# Patient Record
Sex: MALE | Race: WHITE | NOT HISPANIC OR LATINO | Employment: FULL TIME | URBAN - METROPOLITAN AREA
[De-identification: names, ages, dates, MRNs, and addresses within clinical notes are randomized per-mention and may not be internally consistent; named-entity substitution may affect disease eponyms.]

---

## 2017-01-11 ENCOUNTER — TELEPHONE (OUTPATIENT)
Dept: NEUROLOGY | Facility: CLINIC | Age: 42
End: 2017-01-11

## 2017-01-11 NOTE — TELEPHONE ENCOUNTER
Spoke with representative Alvaro at [facility]. He verified that the medication Avonex was approved for lifetime as long as the members benefits did not change. I asked for an authorization number and he stated that no authorization numbers were generated because it is all electronic and instant and that the pharmacy could process it right now and it would go thru.     I called Logan and let him know. He was very grateful and stated he would call his pharmacy to get it shipped now. I informed him to call back with any questions or concerns.

## 2017-01-11 NOTE — TELEPHONE ENCOUNTER
----- Message from Corazon Parker sent at 1/11/2017  8:33 AM CST -----  Contact: [facility] pharmacy-    [phone]  Pharmacy need PA for rx avonex syringe. The pharmacy is faxing over form for PA for the rx.Thanks!

## 2017-03-31 DIAGNOSIS — G35 MULTIPLE SCLEROSIS: ICD-10-CM

## 2017-03-31 NOTE — TELEPHONE ENCOUNTER
----- Message from Hakeem Barry sent at 3/31/2017 11:30 AM CDT -----  Contact: Logan  States he should have MRI every 6 months. April 7 will be 6 months from last. He is having  A little bit of vision issues, not sure if it is related to migraine. Please call [phone]. Thank you!

## 2017-04-07 ENCOUNTER — TELEPHONE (OUTPATIENT)
Dept: NEUROLOGY | Facility: CLINIC | Age: 42
End: 2017-04-07

## 2017-04-07 NOTE — TELEPHONE ENCOUNTER
----- Message from Anna Moreno sent at 4/7/2017  9:22 AM CDT -----  Contact: [phone]  Patient is requesting a call back from the nurse in regards to getting an MRI.   Please call the patient upon request at phone number [phone].

## 2017-04-11 DIAGNOSIS — G35 MULTIPLE SCLEROSIS: Primary | ICD-10-CM

## 2017-04-13 ENCOUNTER — TELEPHONE (OUTPATIENT)
Dept: NEUROLOGY | Facility: CLINIC | Age: 42
End: 2017-04-13

## 2017-04-13 NOTE — TELEPHONE ENCOUNTER
----- Message from Iris Johnson sent at 4/13/2017  2:27 PM CDT -----  Contact: self   Patient wants to speak with a nurse regarding previous phone call please call back at [phone]

## 2017-04-24 DIAGNOSIS — G35 MULTIPLE SCLEROSIS: ICD-10-CM

## 2017-04-28 ENCOUNTER — HOSPITAL ENCOUNTER (OUTPATIENT)
Dept: RADIOLOGY | Facility: HOSPITAL | Age: 42
Discharge: HOME | End: 2017-04-28
Attending: PSYCHIATRY & NEUROLOGY
Payer: COMMERCIAL

## 2017-04-28 ENCOUNTER — TELEPHONE (OUTPATIENT)
Dept: NEUROLOGY | Facility: CLINIC | Age: 42
End: 2017-04-28

## 2017-04-28 DIAGNOSIS — G35 MULTIPLE SCLEROSIS: ICD-10-CM

## 2017-04-28 PROCEDURE — 70553 MRI BRAIN STEM W/O & W/DYE: CPT | Mod: 26,,, | Performed by: RADIOLOGY

## 2017-04-28 PROCEDURE — 70553 MRI BRAIN STEM W/O & W/DYE: CPT | Mod: TC,PO

## 2017-04-28 PROCEDURE — 25500020 PHARM REV CODE 255: Mod: PO | Performed by: PSYCHIATRY & NEUROLOGY

## 2017-04-28 PROCEDURE — A9585 GADOBUTROL INJECTION: HCPCS | Mod: PO | Performed by: PSYCHIATRY & NEUROLOGY

## 2017-04-28 RX ORDER — GADOBUTROL 604.72 MG/ML
9 INJECTION INTRAVENOUS
Status: COMPLETED | OUTPATIENT
Start: 2017-04-28 | End: 2017-04-28

## 2017-04-28 RX ADMIN — GADOBUTROL 9 ML: 604.72 INJECTION INTRAVENOUS at 12:04

## 2017-05-01 ENCOUNTER — TELEPHONE (OUTPATIENT)
Dept: NEUROLOGY | Facility: CLINIC | Age: 42
End: 2017-05-01

## 2017-05-01 NOTE — TELEPHONE ENCOUNTER
----- Message from RT Kevin sent at 5/1/2017  9:16 AM CDT -----  Contact: Delia, [phone] [facility]  Delia, [phone] Option 3 [facility], requesting medication refill for the pt's Avonex pre filled syringes Ref No. [id number], thanks.

## 2017-06-20 ENCOUNTER — TELEPHONE (OUTPATIENT)
Dept: NEUROLOGY | Facility: CLINIC | Age: 42
End: 2017-06-20

## 2017-06-20 DIAGNOSIS — G35 MS (MULTIPLE SCLEROSIS): Primary | ICD-10-CM

## 2017-06-20 RX ORDER — MODAFINIL 100 MG/1
100 TABLET ORAL DAILY
Qty: 30 TABLET | Refills: 0 | Status: SHIPPED | OUTPATIENT
Start: 2017-06-20 | End: 2017-07-19 | Stop reason: SDUPTHER

## 2017-06-20 NOTE — TELEPHONE ENCOUNTER
Spoke with patient. He stated that no matter how much he is able to sleep, he is very fatigue. Patient is only taking Avonex every 7 days. Denies taking any other medications. Patient wants to know if there is something he can do to get more energy, take some sort of vitamin or medication?

## 2017-06-20 NOTE — TELEPHONE ENCOUNTER
----- Message from Nawaf Clement sent at 6/20/2017 12:23 PM CDT -----  Contact: same  Patient called in and requested a message be sent regarding a questions he has about his fatigue.  Patient has Ms.    Patient call back number is [phone]

## 2017-06-23 ENCOUNTER — TELEPHONE (OUTPATIENT)
Dept: NEUROLOGY | Facility: CLINIC | Age: 42
End: 2017-06-23

## 2017-06-23 NOTE — TELEPHONE ENCOUNTER
----- Message from Skye Watson sent at 6/23/2017  3:55 PM CDT -----  Contact: self  Patient called regarding medication was submitted to the pharmacy. Stating for the price of the medication is extremely high and wanted to know if it wasn't covered by insurance. Please contact [phone]

## 2017-06-26 NOTE — TELEPHONE ENCOUNTER
Spoke with the pt, reports the Provigil costs over $600.00. Is there an alternative for this or should we pursue prescription assistance? Please advise.

## 2017-07-10 ENCOUNTER — TELEPHONE (OUTPATIENT)
Dept: NEUROLOGY | Facility: CLINIC | Age: 42
End: 2017-07-10

## 2017-07-10 NOTE — TELEPHONE ENCOUNTER
Patient notified that I will send a message to Jessica Narvaez to see about getting assistance with medication.

## 2017-07-10 NOTE — TELEPHONE ENCOUNTER
----- Message from Katerina Alcantara sent at 7/10/2017 11:32 AM CDT -----  Contact: self  Patient spoke to a nurse about a week ago regarding medication    He has not  heard back form everyone    Please call  to advise.      Thanks

## 2017-07-10 NOTE — TELEPHONE ENCOUNTER
Jc Lopez,     Can you help this patient out? His medication Provigil  for Multiple Sclerosis is going to cost him about $600.

## 2017-07-18 ENCOUNTER — TELEPHONE (OUTPATIENT)
Dept: PHARMACY | Facility: CLINIC | Age: 42
End: 2017-07-18

## 2017-07-19 DIAGNOSIS — G35 MS (MULTIPLE SCLEROSIS): ICD-10-CM

## 2017-07-20 RX ORDER — MODAFINIL 100 MG/1
100 TABLET ORAL DAILY
Qty: 30 TABLET | Refills: 0 | Status: SHIPPED | OUTPATIENT
Start: 2017-07-20 | End: 2017-08-19

## 2017-07-21 ENCOUNTER — TELEPHONE (OUTPATIENT)
Dept: NEUROLOGY | Facility: CLINIC | Age: 42
End: 2017-07-21

## 2017-07-21 NOTE — TELEPHONE ENCOUNTER
Spoke with the pharmacy, they are faxing a prior auth request to our office. Unable to reach pt to let him now we are working on getting provigil approved.

## 2017-07-21 NOTE — TELEPHONE ENCOUNTER
Received fax from pharmacy stating provigil needed a prior authorization.     PA started and approved. Pharmacy and patient both notified.

## 2017-09-15 ENCOUNTER — TELEPHONE (OUTPATIENT)
Dept: NEUROLOGY | Facility: CLINIC | Age: 42
End: 2017-09-15

## 2017-09-15 NOTE — TELEPHONE ENCOUNTER
----- Message from Nazia Borden sent at 9/14/2017  4:11 PM CDT -----  Contact: self  Patient [phone] is calling to check the status of having a prescription Modafinil refilled/has been trying to get this filled for two days but has not received any response/please advise

## 2017-09-21 RX ORDER — MODAFINIL 100 MG/1
100 TABLET ORAL DAILY
Qty: 30 TABLET | Refills: 0 | Status: SHIPPED | OUTPATIENT
Start: 2017-09-21 | End: 2018-01-23 | Stop reason: SDUPTHER

## 2017-09-21 NOTE — TELEPHONE ENCOUNTER
Called the [facility] store in [place] and spoke to Sonia. Sonia stated the never received the medication of Modafinil. Called in Modafinil for 1 refill. Sonia verbalized understanding.

## 2017-09-21 NOTE — TELEPHONE ENCOUNTER
----- Message from Indiana Parada sent at 9/21/2017  3:14 PM CDT -----  Contact: pt  Pt states been trying to get hold office ,3rd time calling and the pharmacy has to regarding prescription that needs to be verified.Pt would like a call back ,please.    .1. What is the name of the medication you are requesting? modafinil (PROVIGIL)  2. What is the dose? 100 mg  3. How do you take the medication? Orally, topically, etc? oral  4. How often do you take this medication? Take 1 tablet (100 mg total) by mouth once daily.   5. Do you need a 30 day or 90 day supply? 30  6. How many refills are you requesting? Up to   7. What is your preferred pharmacy and location of the pharmacy? .  [facility] [address] [place] [address] [id number] [place] [address] [place] [facility] [address] [address]  Phone: [phone] Fax: [phone]      8. Who can we contact with further questions? .[phone]

## 2017-11-03 ENCOUNTER — TELEPHONE (OUTPATIENT)
Dept: NEUROLOGY | Facility: CLINIC | Age: 42
End: 2017-11-03

## 2017-11-03 DIAGNOSIS — G35 MULTIPLE SCLEROSIS: Primary | ICD-10-CM

## 2017-11-03 NOTE — TELEPHONE ENCOUNTER
Appointment Request From: Logan Vaz      With Provider: Shahida Magaña MD [[facility]]      Would Accept With:Only the person I've selected      Preferred Date Range: From 11/3/2017 To 11/6/2017      Preferred Times: Any      Reason for visit: Request an Appt      Comments:   I need to get an MRI set up for this month.   Please contact me once it's arranged.   [phone]     Sent via [email or web address]

## 2017-11-08 NOTE — TELEPHONE ENCOUNTER
The patient will call when he decides where he is going to get the MRI.  Instructed we will need to fax them orders and get it approved.  He will be responsible to make sure we get the disc and report.

## 2017-11-15 ENCOUNTER — TELEPHONE (OUTPATIENT)
Dept: NEUROLOGY | Facility: CLINIC | Age: 42
End: 2017-11-15

## 2017-11-15 NOTE — TELEPHONE ENCOUNTER
Returned call and spoke with patient. He stated he would like his MRI done at [facility]. Orders faxed to [phone].

## 2017-11-15 NOTE — TELEPHONE ENCOUNTER
----- Message from Jim Taylor sent at 11/15/2017 11:57 AM CST -----  Contact: pt  Pt is requesting a callback,would like to schedule his MRI on his brain  Call Back#[phone]  Thanks

## 2017-11-29 ENCOUNTER — OFFICE VISIT (OUTPATIENT)
Dept: FAMILY MEDICINE | Facility: CLINIC | Age: 42
End: 2017-11-29
Payer: COMMERCIAL

## 2017-11-29 VITALS
WEIGHT: 201.75 LBS | SYSTOLIC BLOOD PRESSURE: 124 MMHG | HEIGHT: 70 IN | TEMPERATURE: 98 F | RESPIRATION RATE: 16 BRPM | DIASTOLIC BLOOD PRESSURE: 84 MMHG | HEART RATE: 68 BPM | OXYGEN SATURATION: 97 % | BODY MASS INDEX: 28.88 KG/M2

## 2017-11-29 DIAGNOSIS — H10.32 ACUTE BACTERIAL CONJUNCTIVITIS OF LEFT EYE: Primary | ICD-10-CM

## 2017-11-29 DIAGNOSIS — J30.89 ACUTE NON-SEASONAL ALLERGIC RHINITIS, UNSPECIFIED TRIGGER: ICD-10-CM

## 2017-11-29 PROCEDURE — 90471 IMMUNIZATION ADMIN: CPT | Mod: S$GLB,,, | Performed by: INTERNAL MEDICINE

## 2017-11-29 PROCEDURE — 90686 IIV4 VACC NO PRSV 0.5 ML IM: CPT | Mod: S$GLB,,, | Performed by: INTERNAL MEDICINE

## 2017-11-29 PROCEDURE — 99214 OFFICE O/P EST MOD 30 MIN: CPT | Mod: 25,S$GLB,, | Performed by: NURSE PRACTITIONER

## 2017-11-29 RX ORDER — NEOMYCIN SULFATE, POLYMYXIN B SULFATE AND DEXAMETHASONE 3.5; 10000; 1 MG/ML; [USP'U]/ML; MG/ML
1 SUSPENSION/ DROPS OPHTHALMIC EVERY 4 HOURS
Qty: 5 ML | Refills: 1 | Status: SHIPPED | OUTPATIENT
Start: 2017-11-29 | End: 2018-01-10 | Stop reason: ALTCHOICE

## 2017-11-29 NOTE — PROGRESS NOTES
"Subjective:       Patient ID: Logan Vaz is a 42 y.o. male.    Chief Complaint: Conjunctivitis (left eye)    HPI new patient to me. Here with redness to left eye. Onset yesterday. Has had a cold for the past week. Eye has been crusted closed. Warm compress used. Not painful, just irritated. Taking mucinex and using flonase. Ears fine. Mild dry cough. Some mild nasal congestion, feels the cold is getting better. Sneezing at times. See ROS.    The following portion of the patients history was reviewed and updated as appropriate: allergies, current medications, past medical and surgical history. Past social history and problem list reviewed. Family PMH and Past social history reviewed. Tobacco, Illicit drug use reviewed.     Review of Systems   Constitutional: Negative for fatigue and fever.   HENT: Positive for congestion and sneezing. Negative for sinus pain, sinus pressure and sore throat.    Eyes: Positive for discharge, redness and itching. Negative for visual disturbance.   Respiratory: Positive for cough. Negative for shortness of breath and wheezing.    Cardiovascular: Negative for chest pain and palpitations.   Gastrointestinal: Negative for abdominal pain, diarrhea, nausea and vomiting.   Musculoskeletal: Negative.    Neurological: Negative for headaches.       Objective:     /84   Pulse 68   Temp 98.1 °F (36.7 °C) (Oral)   Resp 16   Ht 5' 10" (1.778 m)   Wt 91.5 kg (201 lb 11.5 oz)   SpO2 97%   BMI 28.94 kg/m²      Physical Exam     Constitutional: oriented to person, place, and time. well-developed and well-nourished.   HENT: normal Canals and TM. Nares patent. Throat without erythema or exudate.   Head: Normocephalic.   Eyes: Conjunctivae erythema left eye with crusting on eyelashes and mucous. Right eye is normal.  Pupils are equal, round, and reactive to light.   Neck: Normal range of motion. Neck supple. No tracheal deviation present. No thyromegaly present. no enlarged or tender " anterior cervical lymph nodes.  Cardiovascular: Normal rate, regular rhythm and normal heart sounds.    Pulmonary/Chest: Effort normal and breath sounds normal. No respiratory distress. No wheezes.   Musculoskeletal: Normal range of motion. Gait and coordination normal  Assessment:       1. Acute bacterial conjunctivitis of left eye    2. Acute non-seasonal allergic rhinitis, unspecified trigger        Plan:         Logan was seen today for conjunctivitis.    Diagnoses and all orders for this visit:    Acute bacterial conjunctivitis of left eye: avoid scratching eyes. Use eye drops as prescribed. Clean with  Danial's no tears baby shampoo.     Acute non-seasonal allergic rhinitis, unspecified trigger: Use flonase daily.     Other orders  -     neomycin-polymyxin-dexamethasone (MAXITROL) 3.5mg/mL-10,000 unit/mL-0.1 % DrpS; Place 1 drop into the left eye every 4 (four) hours.  -     Influenza - Quadrivalent (3 years & older) (PF)      Take medications only as prescribed  Healthy diet, exercise  Adequate rest  Adequate hydration  Avoid allergens  Avoid excessive caffeine

## 2017-12-04 ENCOUNTER — TELEPHONE (OUTPATIENT)
Dept: NEUROLOGY | Facility: CLINIC | Age: 42
End: 2017-12-04

## 2017-12-04 NOTE — TELEPHONE ENCOUNTER
----- Message from Joselyn Park sent at 12/4/2017  3:46 PM CST -----  Contact: self  Would like results from MRI taken last week. Please call back at [phone] (aqtz)

## 2017-12-05 ENCOUNTER — TELEPHONE (OUTPATIENT)
Dept: FAMILY MEDICINE | Facility: CLINIC | Age: 42
End: 2017-12-05

## 2017-12-05 RX ORDER — CIPROFLOXACIN HYDROCHLORIDE 3 MG/ML
1 SOLUTION/ DROPS OPHTHALMIC
Qty: 10 ML | Refills: 0 | Status: SHIPPED | OUTPATIENT
Start: 2017-12-05 | End: 2018-01-10

## 2017-12-05 NOTE — TELEPHONE ENCOUNTER
I sent in different eye drops. If this is bacterial then the drops should work. If due to allergies then it might take longer. Try the new drops, if not improving will get you in to see Eye doctor.

## 2017-12-05 NOTE — TELEPHONE ENCOUNTER
Pt states that not only has it not gotten better but has spread to the other eye. Pt has been using drops as rx'd. Please advise.

## 2017-12-05 NOTE — TELEPHONE ENCOUNTER
Called and spoke with patient. Patient had his [facility] MRI. Informed patient we have not received his MRI results. Instructed patient to call [facility] to have results faxed. Fax number given to patient. Patient verbalized understanding.

## 2017-12-05 NOTE — TELEPHONE ENCOUNTER
----- Message from Skye Watson sent at 12/5/2017  8:36 AM CST -----  Contact: self  Patient called regarding last visit with pink eye, no changes. Please contact [phone] (jejl)

## 2017-12-07 ENCOUNTER — PATIENT MESSAGE (OUTPATIENT)
Dept: NEUROLOGY | Facility: CLINIC | Age: 42
End: 2017-12-07

## 2017-12-07 ENCOUNTER — TELEPHONE (OUTPATIENT)
Dept: NEUROLOGY | Facility: CLINIC | Age: 42
End: 2017-12-07

## 2017-12-07 NOTE — TELEPHONE ENCOUNTER
----- Message from Nazia Borden sent at 12/6/2017  1:58 PM CST -----  Contact: self  Patient [phone] is calling for his MRI results/please call

## 2017-12-13 ENCOUNTER — TELEPHONE (OUTPATIENT)
Dept: NEUROLOGY | Facility: CLINIC | Age: 42
End: 2017-12-13

## 2017-12-13 NOTE — TELEPHONE ENCOUNTER
----- Message from Nazia Borden sent at 12/13/2017  3:02 PM CST -----  Contact: self  Patient [phone] is calling to leave a third message in two weeks for results of test that he had at [facility] MRI of the brain and the spine but he has not received a return call/please call patient as soon as possible

## 2018-01-10 ENCOUNTER — OFFICE VISIT (OUTPATIENT)
Dept: FAMILY MEDICINE | Facility: CLINIC | Age: 43
End: 2018-01-10
Payer: COMMERCIAL

## 2018-01-10 VITALS
DIASTOLIC BLOOD PRESSURE: 80 MMHG | WEIGHT: 202.38 LBS | OXYGEN SATURATION: 97 % | TEMPERATURE: 98 F | HEIGHT: 70 IN | SYSTOLIC BLOOD PRESSURE: 114 MMHG | BODY MASS INDEX: 28.97 KG/M2 | HEART RATE: 87 BPM | RESPIRATION RATE: 16 BRPM

## 2018-01-10 DIAGNOSIS — J01.00 ACUTE NON-RECURRENT MAXILLARY SINUSITIS: Primary | ICD-10-CM

## 2018-01-10 PROCEDURE — 99214 OFFICE O/P EST MOD 30 MIN: CPT | Mod: S$GLB,,, | Performed by: INTERNAL MEDICINE

## 2018-01-10 RX ORDER — AMOXICILLIN AND CLAVULANATE POTASSIUM 875; 125 MG/1; MG/1
1 TABLET, FILM COATED ORAL EVERY 12 HOURS
Qty: 20 TABLET | Refills: 0 | Status: SHIPPED | OUTPATIENT
Start: 2018-01-10 | End: 2019-05-06 | Stop reason: ALTCHOICE

## 2018-01-10 NOTE — PROGRESS NOTES
Subjective:       Patient ID: Logan Vaz is a 42 y.o. male.    @  Medication List with Changes/Refills   New Medications    AMOXICILLIN-CLAVULANATE 875-125MG (AUGMENTIN) 875-125 MG PER TABLET    Take 1 tablet by mouth every 12 (twelve) hours.   Current Medications    INTERFERON BETA-1A (AVONEX) 30 MCG/0.5 ML SYRINGE    Inject 0.5 mLs (30 mcg total) into the muscle every 7 days. Start with 7.5 mcg per 1 wk, then 15 mcg per 1 week, then 22.5 mcg per 1 week, then 30 mcg every week    MODAFINIL (PROVIGIL) 100 MG TAB    Take 1 tablet (100 mg total) by mouth once daily.   Discontinued Medications    CIPROFLOXACIN HCL (CILOXAN) 0.3 % OPHTHALMIC SOLUTION    Place 1 drop into both eyes every 2 (two) hours.    NEOMYCIN-POLYMYXIN-DEXAMETHASONE (MAXITROL) 3.5MG/ML-10,000 UNIT/ML-0.1 % DRPS    Place 1 drop into the left eye every 4 (four) hours.       Chief Complaint: Cough (chest congestion) and restless legs (night is worse)  He presents with 6 days of congestion and worsening sinus pressure.  No fevers. He is having sinus headache. No ear pain or sore throat. He says drainage is thick and yellow. No blood.  Mild cough due to PND but no wheezing or shortness of breath.  No vomiting or diarrhea.  He is just getting over pink eye infection.    Review of Systems   Constitutional: Negative for activity change, appetite change, chills, fatigue and fever.   HENT: Positive for congestion and sinus pressure. Negative for ear discharge, ear pain, mouth sores, postnasal drip, rhinorrhea and sore throat.    Eyes: Negative for pain, discharge and redness.   Respiratory: Positive for cough. Negative for chest tightness, shortness of breath and wheezing.    Gastrointestinal: Negative for abdominal pain, constipation, diarrhea, nausea and vomiting.   Genitourinary: Negative for dysuria.   Musculoskeletal: Negative for arthralgias and neck stiffness.   Skin: Negative for rash.   Neurological: Positive for headaches.   Hematological:  "Negative for adenopathy.       Objective:      Vitals:    01/10/18 1056   BP: 114/80   Pulse: 87   Resp: 16   Temp: 97.8 °F (36.6 °C)   TempSrc: Oral   SpO2: 97%   Weight: 91.8 kg (202 lb 6.1 oz)   Height: 5' 10" (1.778 m)     Body mass index is 29.04 kg/m².  Physical Exam    General appearance: alert, no acute distress  Head: atraumatic  Eyes: PERRL, EMOI, normal conjunctiva, no drainage  Ears: tm normal with good visualization of landmarks on right bulging with purulent fluid, left tm occluded by wax.   Nose: erythematous  mucosa, no polyps or sores, no rhinorrhea  Throat: no erythema, no exudates, tonsils appear normal  Mouth: no sores or lesion, moist mucous membranes  Neck: supple, FROM, no masses, no tenderness  Lymph: no posterior or cervical adenopathy  Lungs: no distress, no retractions, clear to ascultation bilaterally, no wheezing, no rales, no rhonchi  Heart:: Regular rate and rhythm, no murmur  Abdomen: soft, non-tender, no guarding, no rebound, no peritoneal signs, bowel sounds normal, no hepatosplenomegaly, no masses  Skin: no rashes or lesion  Perfusion: good capillary refill, normal pulses      Assessment:       1. Acute non-recurrent maxillary sinusitis        Plan:       Acute non-recurrent maxillary sinusitis  Six days of cold symptoms that are worsening and will treat with abx.  Advised of the signs of worsening to return to clinic.   -     amoxicillin-clavulanate 875-125mg (AUGMENTIN) 875-125 mg per tablet; Take 1 tablet by mouth every 12 (twelve) hours.  Dispense: 20 tablet; Refill: 0    Return if symptoms worsen or fail to improve.      "

## 2018-01-23 DIAGNOSIS — G35 MS (MULTIPLE SCLEROSIS): Primary | ICD-10-CM

## 2018-01-23 RX ORDER — MODAFINIL 100 MG/1
100 TABLET ORAL DAILY
Qty: 30 TABLET | Refills: 0 | OUTPATIENT
Start: 2018-01-23 | End: 2018-01-30 | Stop reason: SDUPTHER

## 2018-01-30 DIAGNOSIS — G35 MS (MULTIPLE SCLEROSIS): ICD-10-CM

## 2018-01-30 RX ORDER — MODAFINIL 100 MG/1
100 TABLET ORAL DAILY
Qty: 30 TABLET | Refills: 0 | OUTPATIENT
Start: 2018-01-30 | End: 2018-02-08 | Stop reason: SDUPTHER

## 2018-01-30 NOTE — TELEPHONE ENCOUNTER
----- Message from Skye Jacobsen sent at 1/30/2018  3:44 PM CST -----  Patient states the pharmacy never received his rx for modafinil (PROVIGIL) 100 MG Tab   EPIC shows it was printed instead of escribed, please call to let patient know when Rx has been sent to pharmacy [phone] (home)     [facility] [address] [place] [address] [address] [place] [address] AT [facility] [address]  [address] [place] [address]  Phone: [phone] Fax: [phone]

## 2018-02-08 DIAGNOSIS — G35 MS (MULTIPLE SCLEROSIS): ICD-10-CM

## 2018-02-08 RX ORDER — MODAFINIL 100 MG/1
100 TABLET ORAL DAILY
Qty: 30 TABLET | Refills: 0 | Status: SHIPPED | OUTPATIENT
Start: 2018-02-08 | End: 2018-04-23 | Stop reason: SDUPTHER

## 2018-02-08 NOTE — TELEPHONE ENCOUNTER
Called pharmacy to see if medication was filled. Pharmacist confirmed it was not filled nor picked up. Will send over the prescription and contact patient that it was taken care of.

## 2018-02-08 NOTE — TELEPHONE ENCOUNTER
----- Message from Anna Moreno sent at 2/7/2018  4:19 PM CST -----  Contact: [phone]  Please call patient with the status of modafinil, been out of 1 week.  Please call [phone]    Patient will be using   [facility] [address] [place] [address] [address] [place] [address] AT [facility] OF [address]  [address] [place] [address]  Phone: [phone] Fax: [phone]

## 2018-03-21 DIAGNOSIS — G35 MULTIPLE SCLEROSIS: ICD-10-CM

## 2018-03-26 DIAGNOSIS — J01.00 ACUTE NON-RECURRENT MAXILLARY SINUSITIS: ICD-10-CM

## 2018-03-26 NOTE — TELEPHONE ENCOUNTER
----- Message from Anna Moreno sent at 3/26/2018  4:35 PM CDT -----  Contact: [facility] ph#[phone]/hiue  Patient requesting a refill on avonex.    Patient will be using [facility] ph#[phone].

## 2018-03-26 NOTE — TELEPHONE ENCOUNTER
----- Message from Adriana Frederick sent at 3/26/2018  4:45 PM CDT -----  Contact: self [phone]  He is calling to follow up on the AVONEX prescription.  It needs to be sent [facility].  Please call him with the status.  He only has one shot left. Thank you!

## 2018-03-27 DIAGNOSIS — G35 MULTIPLE SCLEROSIS: ICD-10-CM

## 2018-03-27 DIAGNOSIS — J01.00 ACUTE NON-RECURRENT MAXILLARY SINUSITIS: ICD-10-CM

## 2018-03-27 RX ORDER — AMOXICILLIN AND CLAVULANATE POTASSIUM 875; 125 MG/1; MG/1
1 TABLET, FILM COATED ORAL EVERY 12 HOURS
Qty: 20 TABLET | Refills: 0 | Status: CANCELLED | OUTPATIENT
Start: 2018-03-27

## 2018-04-04 DIAGNOSIS — G35 MULTIPLE SCLEROSIS: ICD-10-CM

## 2018-04-04 NOTE — TELEPHONE ENCOUNTER
----- Message from Amy Johnstonbrigette sent at 4/4/2018  1:56 PM CDT -----  Contact: Self  Patient is requesting a refill of his interferon beta-1a (AVONEX) 30 mcg/0.5 mL syringe. Looking in his chart it looks like it was sent to one of the local [facility] the [facility].  He is not out of his medicine, please get this taking care of right away.  Call him back at [phone] (home).   Thank you!    [facility]  [phone]

## 2018-04-04 NOTE — TELEPHONE ENCOUNTER
----- Message from Silvia Dueñas sent at 4/4/2018  2:25 PM CDT -----  Contact: [facility] pharmacy  Requesting stat refill medication Avonex.   Call back number [phone]  Fax [phone]

## 2018-04-19 DIAGNOSIS — G35 MS (MULTIPLE SCLEROSIS): ICD-10-CM

## 2018-04-19 RX ORDER — MODAFINIL 100 MG/1
TABLET ORAL
Qty: 30 TABLET | Refills: 0 | OUTPATIENT
Start: 2018-04-19

## 2018-04-20 RX ORDER — MODAFINIL 100 MG/1
100 TABLET ORAL DAILY
Qty: 30 TABLET | Refills: 0 | Status: CANCELLED | OUTPATIENT
Start: 2018-04-20 | End: 2019-04-20

## 2018-04-20 NOTE — TELEPHONE ENCOUNTER
----- Message from Nawaf GRIFFIN Friquinn sent at 4/20/2018  2:55 PM CDT -----  Contact: [facility]  Type:  Pharmacy Calling to Clarify an RX    Name of Caller:  Farzaneh  Pharmacy Name:  [facility]  Prescription Name:  modafinil (PROVIGIL) 100 MG Tab  What do they need to clarify?:  Refill Request  Best Call Back Number:    [facility] [address] [place] [address] [address] [place] [address] AT [facility] OF [address] [facility]  [address] [place] [address]  Phone: [phone] Fax: [phone]    Additional Information:  n/a

## 2018-04-23 DIAGNOSIS — G35 MS (MULTIPLE SCLEROSIS): ICD-10-CM

## 2018-04-23 RX ORDER — MODAFINIL 100 MG/1
100 TABLET ORAL DAILY
Qty: 30 TABLET | Refills: 0 | Status: SHIPPED | OUTPATIENT
Start: 2018-04-23 | End: 2018-06-19 | Stop reason: SDUPTHER

## 2018-06-19 DIAGNOSIS — G35 MS (MULTIPLE SCLEROSIS): ICD-10-CM

## 2018-06-19 RX ORDER — MODAFINIL 100 MG/1
100 TABLET ORAL DAILY
Qty: 30 TABLET | Refills: 0 | Status: SHIPPED | OUTPATIENT
Start: 2018-06-19 | End: 2019-05-06

## 2018-07-26 ENCOUNTER — TELEPHONE (OUTPATIENT)
Dept: NEUROLOGY | Facility: CLINIC | Age: 43
End: 2018-07-26

## 2018-07-26 NOTE — TELEPHONE ENCOUNTER
----- Message from Julissa Paulson sent at 7/26/2018  8:22 AM CDT -----  Contact: cristin   Type:  Pharmacy Calling to Clarify an RX    Name of Caller:  Cristin   [facility] Name:  [facility]   Prescription Name:  interferon beta-1a (AVONEX) 30 mcg/0.5 mL syringe  What do they need to clarify?:  Unable to get a hold of patient, putting prescription on hold  Best Call Back Number:    [facility] [place] [place] [facility]  [address] [facility]  [address]  formerly [facility] [address]  Phone: [phone] Fax: [phone]  Additional Information:

## 2018-07-26 NOTE — TELEPHONE ENCOUNTER
Pharmacy unable to speak with patient about delivery of injections. Called, no answer, unable to leave voicemail due to busy signal.

## 2018-08-29 ENCOUNTER — TELEPHONE (OUTPATIENT)
Dept: NEUROLOGY | Facility: CLINIC | Age: 43
End: 2018-08-29

## 2018-08-29 NOTE — TELEPHONE ENCOUNTER
----- Message from Abdi Quintana sent at 8/29/2018  2:34 PM CDT -----  Contact: Patient  Logan, [phone]. Calling to have MRI order faxed to [facility] at [phone]. Would like a call confirming when the order was sent. Please advise. Thanks.

## 2018-08-30 DIAGNOSIS — G37.9 DEMYELINATING DISEASE: ICD-10-CM

## 2018-09-06 ENCOUNTER — PATIENT MESSAGE (OUTPATIENT)
Dept: NEUROLOGY | Facility: CLINIC | Age: 43
End: 2018-09-06

## 2019-05-06 ENCOUNTER — OFFICE VISIT (OUTPATIENT)
Dept: FAMILY MEDICINE | Facility: CLINIC | Age: 44
End: 2019-05-06
Payer: COMMERCIAL

## 2019-05-06 VITALS
BODY MASS INDEX: 29.03 KG/M2 | WEIGHT: 202.81 LBS | SYSTOLIC BLOOD PRESSURE: 100 MMHG | HEART RATE: 71 BPM | DIASTOLIC BLOOD PRESSURE: 62 MMHG | RESPIRATION RATE: 20 BRPM | OXYGEN SATURATION: 98 % | TEMPERATURE: 98 F | HEIGHT: 70 IN

## 2019-05-06 DIAGNOSIS — Z00.00 LABORATORY EXAM ORDERED AS PART OF ROUTINE GENERAL MEDICAL EXAMINATION: ICD-10-CM

## 2019-05-06 DIAGNOSIS — Z00.00 ANNUAL PHYSICAL EXAM: Primary | ICD-10-CM

## 2019-05-06 LAB
ALBUMIN SERPL BCP-MCNC: 4 G/DL (ref 3.5–5.2)
ALP SERPL-CCNC: 146 U/L (ref 55–135)
ALT SERPL W/O P-5'-P-CCNC: 24 U/L (ref 10–44)
ANION GAP SERPL CALC-SCNC: 7 MMOL/L (ref 8–16)
AST SERPL-CCNC: 19 U/L (ref 10–40)
BASOPHILS # BLD AUTO: 0.03 K/UL (ref 0–0.2)
BASOPHILS NFR BLD: 0.6 % (ref 0–1.9)
BILIRUB SERPL-MCNC: 0.7 MG/DL (ref 0.1–1)
BUN SERPL-MCNC: 15 MG/DL (ref 6–20)
CALCIUM SERPL-MCNC: 9.5 MG/DL (ref 8.7–10.5)
CHLORIDE SERPL-SCNC: 104 MMOL/L (ref 95–110)
CHOLEST SERPL-MCNC: 221 MG/DL (ref 120–199)
CHOLEST/HDLC SERPL: 5.1 {RATIO} (ref 2–5)
CO2 SERPL-SCNC: 28 MMOL/L (ref 23–29)
CREAT SERPL-MCNC: 1 MG/DL (ref 0.5–1.4)
DIFFERENTIAL METHOD: NORMAL
EOSINOPHIL # BLD AUTO: 0.1 K/UL (ref 0–0.5)
EOSINOPHIL NFR BLD: 1.7 % (ref 0–8)
ERYTHROCYTE [DISTWIDTH] IN BLOOD BY AUTOMATED COUNT: 12.1 % (ref 11.5–14.5)
EST. GFR  (AFRICAN AMERICAN): >60 ML/MIN/1.73 M^2
EST. GFR  (NON AFRICAN AMERICAN): >60 ML/MIN/1.73 M^2
GLUCOSE SERPL-MCNC: 93 MG/DL (ref 70–110)
HCT VFR BLD AUTO: 48.7 % (ref 40–54)
HDLC SERPL-MCNC: 43 MG/DL (ref 40–75)
HDLC SERPL: 19.5 % (ref 20–50)
HGB BLD-MCNC: 16 G/DL (ref 14–18)
IMM GRANULOCYTES # BLD AUTO: 0 K/UL (ref 0–0.04)
IMM GRANULOCYTES NFR BLD AUTO: 0 % (ref 0–0.5)
LDLC SERPL CALC-MCNC: 147.2 MG/DL (ref 63–159)
LYMPHOCYTES # BLD AUTO: 1.8 K/UL (ref 1–4.8)
LYMPHOCYTES NFR BLD: 34.3 % (ref 18–48)
MCH RBC QN AUTO: 29.1 PG (ref 27–31)
MCHC RBC AUTO-ENTMCNC: 32.9 G/DL (ref 32–36)
MCV RBC AUTO: 89 FL (ref 82–98)
MONOCYTES # BLD AUTO: 0.5 K/UL (ref 0.3–1)
MONOCYTES NFR BLD: 9.5 % (ref 4–15)
NEUTROPHILS # BLD AUTO: 2.8 K/UL (ref 1.8–7.7)
NEUTROPHILS NFR BLD: 53.9 % (ref 38–73)
NONHDLC SERPL-MCNC: 178 MG/DL
NRBC BLD-RTO: 0 /100 WBC
PLATELET # BLD AUTO: 192 K/UL (ref 150–350)
PMV BLD AUTO: 11.5 FL (ref 9.2–12.9)
POTASSIUM SERPL-SCNC: 4.6 MMOL/L (ref 3.5–5.1)
PROT SERPL-MCNC: 7.3 G/DL (ref 6–8.4)
RBC # BLD AUTO: 5.5 M/UL (ref 4.6–6.2)
SODIUM SERPL-SCNC: 139 MMOL/L (ref 136–145)
TRIGL SERPL-MCNC: 154 MG/DL (ref 30–150)
WBC # BLD AUTO: 5.25 K/UL (ref 3.9–12.7)

## 2019-05-06 PROCEDURE — 80053 COMPREHEN METABOLIC PANEL: CPT

## 2019-05-06 PROCEDURE — 80061 LIPID PANEL: CPT

## 2019-05-06 PROCEDURE — 99396 PREV VISIT EST AGE 40-64: CPT | Mod: 25,S$GLB,, | Performed by: NURSE PRACTITIONER

## 2019-05-06 PROCEDURE — 36415 COLL VENOUS BLD VENIPUNCTURE: CPT | Mod: S$GLB,,, | Performed by: NURSE PRACTITIONER

## 2019-05-06 PROCEDURE — 36415 PR COLLECTION VENOUS BLOOD,VENIPUNCTURE: ICD-10-PCS | Mod: S$GLB,,, | Performed by: NURSE PRACTITIONER

## 2019-05-06 PROCEDURE — 85025 COMPLETE CBC W/AUTO DIFF WBC: CPT

## 2019-05-06 PROCEDURE — 99396 PR PREVENTIVE VISIT,EST,40-64: ICD-10-PCS | Mod: 25,S$GLB,, | Performed by: NURSE PRACTITIONER

## 2019-05-06 NOTE — PROGRESS NOTES
Subjective:       Patient ID: Logan Vaz is a 44 y.o. male.    Chief Complaint: Annual Exam    HPI here for annual exam and [employer] physical. States he is doing well. He was misdiagnosed with MS in 2004. Was treated for about 5 years before it was ruled out. 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 He is able to fully participate in activities. He has good vitals. He denies any specific concerns. See ROS.    The following portion of the patients history was reviewed and updated as appropriate: allergies, current medications, past medical and surgical history. Past social history and problem list reviewed. Family PMH and Past social history reviewed. Tobacco, Illicit drug use reviewed.     Review of Systems   Constitutional: Negative for activity change, appetite change, chills, fatigue, fever and unexpected weight change.   HENT: Negative for congestion, hearing loss, mouth sores, sneezing, trouble swallowing and voice change.    Eyes: Negative for redness and visual disturbance.   Respiratory: Negative for cough, choking, chest tightness, shortness of breath and wheezing.    Cardiovascular: Negative for chest pain, palpitations and leg swelling.   Gastrointestinal: Negative for abdominal distention, abdominal pain, blood in stool, constipation, diarrhea, nausea and vomiting.   Endocrine: Negative for cold intolerance, heat intolerance, polydipsia, polyphagia and polyuria.   Genitourinary: Negative for decreased urine volume, difficulty urinating, flank pain, frequency and urgency.   Musculoskeletal: Negative for arthralgias, back pain and gait problem.   Skin: Negative for pallor, rash and wound.   Allergic/Immunologic: Negative for environmental allergies and food allergies.   Neurological: Negative for dizziness, tremors, seizures, speech difficulty, weakness and headaches.   Hematological: Negative for adenopathy. Does not bruise/bleed easily.  "  Psychiatric/Behavioral: Negative for agitation, behavioral problems, confusion, decreased concentration, dysphoric mood, self-injury, sleep disturbance and suicidal ideas. The patient is not nervous/anxious.        Objective:     /62   Pulse 71   Temp 97.9 °F (36.6 °C) (Oral)   Resp 20   Ht 5' 10" (1.778 m)   Wt 92 kg (202 lb 12.8 oz)   SpO2 98%   BMI 29.10 kg/m²      Physical Exam   Constitutional: He is oriented to person, place, and time. He appears well-developed and well-nourished. He is cooperative. No distress.   HENT:   Head: Normocephalic and atraumatic.   Right Ear: Tympanic membrane, external ear and ear canal normal.   Left Ear: Tympanic membrane, external ear and ear canal normal.   Nose: No mucosal edema, rhinorrhea or sinus tenderness.   Mouth/Throat: Uvula is midline, oropharynx is clear and moist and mucous membranes are normal. No oropharyngeal exudate, posterior oropharyngeal edema or posterior oropharyngeal erythema.   Eyes: Pupils are equal, round, and reactive to light. Conjunctivae, EOM and lids are normal. Right eye exhibits no discharge and no exudate. Left eye exhibits no discharge and no exudate.   Neck: Trachea normal, normal range of motion and full passive range of motion without pain. Neck supple. No JVD present. No tracheal tenderness present. Carotid bruit is not present. No tracheal deviation present. No thyroid mass and no thyromegaly present.   Cardiovascular: Normal rate, regular rhythm, S1 normal, S2 normal, normal heart sounds and normal pulses.   No murmur heard.  Pulmonary/Chest: Effort normal and breath sounds normal. He has no wheezes. He has no rhonchi. He has no rales. He exhibits no tenderness.   Abdominal: Soft. Normal appearance and bowel sounds are normal. He exhibits no distension and no abdominal bruit. There is no hepatosplenomegaly. There is no tenderness. No hernia. Hernia confirmed negative in the right inguinal area and confirmed negative in the " left inguinal area.   Genitourinary: Testes normal and penis normal. Circumcised.   Musculoskeletal: Normal range of motion.   Gait and coordination normal. Finger-nose coordination normal. Heel toe ambulation normal. 5/5 upper and lower extremity strength.  strong, equal bilaterally.    Lymphadenopathy:     He has no cervical adenopathy.        Right cervical: No superficial cervical adenopathy present.       Left cervical: No superficial cervical adenopathy present.     He has no axillary adenopathy. No inguinal adenopathy noted on the right or left side.        Right: No supraclavicular adenopathy present.        Left: No supraclavicular adenopathy present.   Neurological: He is alert and oriented to person, place, and time. He has normal strength. He displays no tremor.   Skin: Skin is warm and dry. Capillary refill takes less than 2 seconds. No rash noted.   Psychiatric: He has a normal mood and affect. His speech is normal and behavior is normal. Judgment and thought content normal. His mood appears not anxious. Cognition and memory are normal. He does not exhibit a depressed mood.       Assessment:       1. Annual physical exam    2. Laboratory exam ordered as part of routine general medical examination        Plan:         Logan was seen today for annual exam.    Diagnoses and all orders for this visit:    Annual physical exam: paperwork completed. Copy of physical sent for scanning into Epic. He is cleared to participate without restrictions.     Laboratory exam ordered as part of routine general medical examination: he is due for routine labs.   -     CBC auto differential  -     Comprehensive metabolic panel  -     Lipid panel      Healthy diet, exercise  Adequate rest  Adequate hydration  Avoid allergens  Avoid excessive caffeine

## 2019-05-07 ENCOUNTER — PATIENT MESSAGE (OUTPATIENT)
Dept: FAMILY MEDICINE | Facility: CLINIC | Age: 44
End: 2019-05-07

## 2019-11-11 ENCOUNTER — IMMUNIZATION (OUTPATIENT)
Dept: FAMILY MEDICINE | Facility: CLINIC | Age: 44
End: 2019-11-11
Payer: COMMERCIAL

## 2019-11-11 PROCEDURE — 90471 FLU VACCINE (QUAD) GREATER THAN OR EQUAL TO 3YO PRESERVATIVE FREE IM: ICD-10-PCS | Mod: S$GLB,,, | Performed by: INTERNAL MEDICINE

## 2019-11-11 PROCEDURE — 90686 FLU VACCINE (QUAD) GREATER THAN OR EQUAL TO 3YO PRESERVATIVE FREE IM: ICD-10-PCS | Mod: S$GLB,,, | Performed by: INTERNAL MEDICINE

## 2019-11-11 PROCEDURE — 90471 IMMUNIZATION ADMIN: CPT | Mod: S$GLB,,, | Performed by: INTERNAL MEDICINE

## 2019-11-11 PROCEDURE — 90686 IIV4 VACC NO PRSV 0.5 ML IM: CPT | Mod: S$GLB,,, | Performed by: INTERNAL MEDICINE

## 2020-10-05 ENCOUNTER — PATIENT MESSAGE (OUTPATIENT)
Dept: ADMINISTRATIVE | Facility: HOSPITAL | Age: 45
End: 2020-10-05

## 2021-01-04 ENCOUNTER — PATIENT MESSAGE (OUTPATIENT)
Dept: ADMINISTRATIVE | Facility: HOSPITAL | Age: 46
End: 2021-01-04

## 2021-05-06 ENCOUNTER — PATIENT MESSAGE (OUTPATIENT)
Dept: RESEARCH | Facility: HOSPITAL | Age: 46
End: 2021-05-06

## 2021-12-14 ENCOUNTER — OFFICE VISIT (OUTPATIENT)
Dept: CARDIOLOGY | Facility: CLINIC | Age: 46
End: 2021-12-14
Payer: COMMERCIAL

## 2021-12-14 VITALS
BODY MASS INDEX: 29.7 KG/M2 | WEIGHT: 207.44 LBS | HEIGHT: 70 IN | SYSTOLIC BLOOD PRESSURE: 136 MMHG | DIASTOLIC BLOOD PRESSURE: 89 MMHG | HEART RATE: 69 BPM

## 2021-12-14 DIAGNOSIS — E78.2 MIXED HYPERLIPIDEMIA: ICD-10-CM

## 2021-12-14 DIAGNOSIS — Z13.6 ENCOUNTER FOR SCREENING FOR CARDIOVASCULAR DISORDERS: ICD-10-CM

## 2021-12-14 DIAGNOSIS — Z71.89 CARDIAC RISK COUNSELING: ICD-10-CM

## 2021-12-14 DIAGNOSIS — R07.89 OTHER CHEST PAIN: ICD-10-CM

## 2021-12-14 PROCEDURE — 99999 PR PBB SHADOW E&M-EST. PATIENT-LVL III: CPT | Mod: PBBFAC,,, | Performed by: INTERNAL MEDICINE

## 2021-12-14 PROCEDURE — 99999 PR PBB SHADOW E&M-EST. PATIENT-LVL III: ICD-10-PCS | Mod: PBBFAC,,, | Performed by: INTERNAL MEDICINE

## 2021-12-14 PROCEDURE — 93005 ELECTROCARDIOGRAM TRACING: CPT | Mod: PO

## 2021-12-14 PROCEDURE — 93010 EKG 12-LEAD: ICD-10-PCS | Mod: S$GLB,,, | Performed by: INTERNAL MEDICINE

## 2021-12-14 PROCEDURE — 99204 OFFICE O/P NEW MOD 45 MIN: CPT | Mod: 25,S$GLB,, | Performed by: INTERNAL MEDICINE

## 2021-12-14 PROCEDURE — 93010 ELECTROCARDIOGRAM REPORT: CPT | Mod: S$GLB,,, | Performed by: INTERNAL MEDICINE

## 2021-12-14 PROCEDURE — 99204 PR OFFICE/OUTPT VISIT, NEW, LEVL IV, 45-59 MIN: ICD-10-PCS | Mod: 25,S$GLB,, | Performed by: INTERNAL MEDICINE

## 2021-12-16 ENCOUNTER — HOSPITAL ENCOUNTER (OUTPATIENT)
Dept: RADIOLOGY | Facility: HOSPITAL | Age: 46
Discharge: HOME | End: 2021-12-16
Attending: INTERNAL MEDICINE
Payer: COMMERCIAL

## 2021-12-16 ENCOUNTER — CLINICAL SUPPORT (OUTPATIENT)
Dept: CARDIOLOGY | Facility: HOSPITAL | Age: 46
End: 2021-12-16
Attending: INTERNAL MEDICINE
Payer: COMMERCIAL

## 2021-12-16 VITALS — HEIGHT: 70 IN | WEIGHT: 207 LBS | BODY MASS INDEX: 29.63 KG/M2

## 2021-12-16 DIAGNOSIS — R07.89 OTHER CHEST PAIN: ICD-10-CM

## 2021-12-16 DIAGNOSIS — Z13.6 ENCOUNTER FOR SCREENING FOR CARDIOVASCULAR DISORDERS: ICD-10-CM

## 2021-12-16 LAB
CV STRESS BASE HR: 67 BPM
DIASTOLIC BLOOD PRESSURE: 78 MMHG
OHS CV CPX 1 MINUTE RECOVERY HEART RATE: 151 BPM
OHS CV CPX 85 PERCENT MAX PREDICTED HEART RATE MALE: 150
OHS CV CPX ESTIMATED METS: 12
OHS CV CPX MAX PREDICTED HEART RATE: 176
OHS CV CPX PATIENT IS FEMALE: 0
OHS CV CPX PATIENT IS MALE: 1
OHS CV CPX PEAK DIASTOLIC BLOOD PRESSURE: 74 MMHG
OHS CV CPX PEAK HEAR RATE: 181 BPM
OHS CV CPX PEAK RATE PRESSURE PRODUCT: NORMAL
OHS CV CPX PEAK SYSTOLIC BLOOD PRESSURE: 155 MMHG
OHS CV CPX PERCENT MAX PREDICTED HEART RATE ACHIEVED: 103
OHS CV CPX RATE PRESSURE PRODUCT PRESENTING: 8442
STRESS ECHO POST EXERCISE DUR MIN: 6 MINUTES
STRESS ECHO POST EXERCISE DUR SEC: 28 SECONDS
SYSTOLIC BLOOD PRESSURE: 126 MMHG

## 2021-12-16 PROCEDURE — 93018 EXERCISE STRESS - EKG (CUPID ONLY): ICD-10-PCS | Mod: ,,, | Performed by: INTERNAL MEDICINE

## 2021-12-16 PROCEDURE — 93016 CV STRESS TEST SUPVJ ONLY: CPT | Mod: ,,, | Performed by: INTERNAL MEDICINE

## 2021-12-16 PROCEDURE — 93016 EXERCISE STRESS - EKG (CUPID ONLY): ICD-10-PCS | Mod: ,,, | Performed by: INTERNAL MEDICINE

## 2021-12-16 PROCEDURE — 75571 CT HRT W/O DYE W/CA TEST: CPT | Mod: 26,,, | Performed by: RADIOLOGY

## 2021-12-16 PROCEDURE — 75571 CT CALCIUM SCORING CARDIAC: ICD-10-PCS | Mod: 26,,, | Performed by: RADIOLOGY

## 2021-12-16 PROCEDURE — 93017 CV STRESS TEST TRACING ONLY: CPT | Mod: PO

## 2021-12-16 PROCEDURE — 93018 CV STRESS TEST I&R ONLY: CPT | Mod: ,,, | Performed by: INTERNAL MEDICINE

## 2021-12-16 PROCEDURE — 75571 CT HRT W/O DYE W/CA TEST: CPT | Mod: TC,PO

## 2021-12-17 ENCOUNTER — TELEPHONE (OUTPATIENT)
Dept: CARDIOLOGY | Facility: CLINIC | Age: 46
End: 2021-12-17
Payer: COMMERCIAL

## 2021-12-21 ENCOUNTER — PATIENT MESSAGE (OUTPATIENT)
Dept: NEUROLOGY | Facility: CLINIC | Age: 46
End: 2021-12-21
Payer: COMMERCIAL

## 2022-11-21 NOTE — TELEPHONE ENCOUNTER
----- Message from Iris Johnson sent at 4/28/2017  3:00 PM CDT -----  Contact: Brisa SHERWOOD  Placed call to pod, Brisa wants to speak with a nurse regarding [facility]ONEX please call back at [phone]  
Returned call. No answer.   
[id number]

## 2022-12-09 DIAGNOSIS — M25.539 PAIN IN WRIST, UNSPECIFIED LATERALITY: Primary | ICD-10-CM

## 2024-01-11 ENCOUNTER — OFFICE VISIT (OUTPATIENT)
Dept: FAMILY MEDICINE | Facility: CLINIC | Age: 49
End: 2024-01-11
Payer: COMMERCIAL

## 2024-01-11 VITALS
RESPIRATION RATE: 18 BRPM | BODY MASS INDEX: 30.21 KG/M2 | OXYGEN SATURATION: 96 % | WEIGHT: 211 LBS | TEMPERATURE: 98 F | DIASTOLIC BLOOD PRESSURE: 86 MMHG | HEART RATE: 88 BPM | SYSTOLIC BLOOD PRESSURE: 132 MMHG | HEIGHT: 70 IN

## 2024-01-11 DIAGNOSIS — Z11.59 NEED FOR HEPATITIS C SCREENING TEST: ICD-10-CM

## 2024-01-11 DIAGNOSIS — Z12.11 COLON CANCER SCREENING: ICD-10-CM

## 2024-01-11 DIAGNOSIS — Z11.4 ENCOUNTER FOR SCREENING FOR HIV: ICD-10-CM

## 2024-01-11 DIAGNOSIS — Z00.00 ROUTINE PHYSICAL EXAMINATION: Primary | ICD-10-CM

## 2024-01-11 DIAGNOSIS — J98.9 REACTIVE AIRWAY DISEASE WITHOUT ASTHMA: ICD-10-CM

## 2024-01-11 PROCEDURE — 3075F SYST BP GE 130 - 139MM HG: CPT | Mod: CPTII,S$GLB,, | Performed by: NURSE PRACTITIONER

## 2024-01-11 PROCEDURE — 3079F DIAST BP 80-89 MM HG: CPT | Mod: CPTII,S$GLB,, | Performed by: NURSE PRACTITIONER

## 2024-01-11 PROCEDURE — 1159F MED LIST DOCD IN RCRD: CPT | Mod: CPTII,S$GLB,, | Performed by: NURSE PRACTITIONER

## 2024-01-11 PROCEDURE — 99396 PREV VISIT EST AGE 40-64: CPT | Mod: S$GLB,,, | Performed by: NURSE PRACTITIONER

## 2024-01-11 PROCEDURE — 1160F RVW MEDS BY RX/DR IN RCRD: CPT | Mod: CPTII,S$GLB,, | Performed by: NURSE PRACTITIONER

## 2024-01-11 PROCEDURE — 3008F BODY MASS INDEX DOCD: CPT | Mod: CPTII,S$GLB,, | Performed by: NURSE PRACTITIONER

## 2024-01-11 RX ORDER — PREDNISONE 20 MG/1
TABLET ORAL
Qty: 9 TABLET | Refills: 0 | Status: SHIPPED | OUTPATIENT
Start: 2024-01-11 | End: 2024-01-16

## 2024-01-11 NOTE — PROGRESS NOTES
"Subjective:       Patient ID: Logan Vaz is a 48 y.o. male.    Chief Complaint: Cough (1 month )  The patient is here for routine physical. Patient is generally feeling well.  He tells me that his wife had the flu a few weeks ago and he probably caught that from her at that time.  Since then he has had a dry nagging cough that is very bothersome to him.  He denies any fever or chills.    HPI  Review of Systems   Constitutional:  Negative for activity change and appetite change.   HENT:  Negative for congestion, postnasal drip, rhinorrhea and sinus pressure.    Eyes:  Negative for pain and redness.   Respiratory:  Positive for cough. Negative for choking and chest tightness.    Gastrointestinal:  Negative for abdominal distention, abdominal pain, blood in stool, constipation, diarrhea, nausea and vomiting.   Endocrine: Negative for polydipsia and polyphagia.   Genitourinary:  Negative for dysuria and hematuria.   Musculoskeletal:  Negative for arthralgias and myalgias.   Skin:  Negative for color change and rash.   Neurological:  Negative for dizziness and headaches.   Psychiatric/Behavioral:  Negative for agitation and behavioral problems.        Past medical, surgical, family and social history reviewed.  Objective:     Vitals:    01/11/24 1446   BP: 132/86   Pulse: 88   Resp: 18   Temp: 98.4 °F (36.9 °C)   SpO2: 96%   Weight: 95.7 kg (210 lb 15.7 oz)   Height: 5' 10" (1.778 m)   PainSc: 0-No pain     Body mass index is 30.27 kg/m².     Physical Exam  Constitutional:       General: He is not in acute distress.     Appearance: He is well-developed. He is not diaphoretic.   HENT:      Head: Normocephalic and atraumatic.      Right Ear: Hearing, ear canal and external ear normal. Tympanic membrane has decreased mobility.      Left Ear: Hearing, ear canal and external ear normal. Tympanic membrane has decreased mobility.      Nose: Mucosal edema and rhinorrhea present.      Mouth/Throat:      Pharynx: Uvula " midline. No posterior oropharyngeal erythema.   Eyes:      General:         Right eye: No discharge.         Left eye: No discharge.      Conjunctiva/sclera: Conjunctivae normal.      Pupils: Pupils are equal, round, and reactive to light.   Neck:      Thyroid: No thyromegaly.      Vascular: No carotid bruit or JVD.      Trachea: Trachea normal. No tracheal deviation.   Cardiovascular:      Rate and Rhythm: Normal rate and regular rhythm.      Heart sounds: No murmur heard.     No friction rub. No gallop.   Pulmonary:      Effort: Pulmonary effort is normal. No respiratory distress.      Breath sounds: Normal breath sounds. No stridor. No wheezing or rales.   Chest:      Chest wall: No tenderness.   Abdominal:      General: Bowel sounds are normal. There is no distension.      Palpations: Abdomen is soft. There is no mass.      Tenderness: There is no abdominal tenderness. There is no guarding or rebound.   Musculoskeletal:         General: Normal range of motion.      Cervical back: Normal range of motion and neck supple.   Lymphadenopathy:      Cervical: No cervical adenopathy.   Skin:     General: Skin is warm and dry.   Neurological:      Mental Status: He is alert and oriented to person, place, and time.      Coordination: Coordination normal.   Psychiatric:         Behavior: Behavior normal.         Thought Content: Thought content normal.         Judgment: Judgment normal.         Assessment:       1. Routine physical examination    2. Colon cancer screening    3. Need for hepatitis C screening test    4. Encounter for screening for HIV    5. Reactive airway disease without asthma        Plan:       Logan was seen today for cough.    Diagnoses and all orders for this visit:    Routine physical examination    Colon cancer screening  -     Case Request Endoscopy: COLONOSCOPY    Need for hepatitis C screening test  -     Hepatitis C Antibody; Future    Encounter for screening for HIV  -     HIV 1/2 Ag/Ab (4th  Gen); Future    Reactive airway disease without asthma  -     predniSONE (DELTASONE) 20 MG tablet; Take 2 tablets (40 mg total) by mouth once daily for 3 days, THEN 1 tablet (20 mg total) once daily for 3 days.

## 2024-01-12 ENCOUNTER — TELEPHONE (OUTPATIENT)
Dept: GASTROENTEROLOGY | Facility: CLINIC | Age: 49
End: 2024-01-12
Payer: COMMERCIAL

## 2024-01-19 ENCOUNTER — LAB VISIT (OUTPATIENT)
Dept: LAB | Facility: HOSPITAL | Age: 49
End: 2024-01-19
Attending: NURSE PRACTITIONER
Payer: COMMERCIAL

## 2024-01-19 DIAGNOSIS — Z11.59 NEED FOR HEPATITIS C SCREENING TEST: ICD-10-CM

## 2024-01-19 DIAGNOSIS — Z11.4 ENCOUNTER FOR SCREENING FOR HIV: ICD-10-CM

## 2024-01-19 LAB
HCV AB SERPL QL IA: NORMAL
HIV 1+2 AB+HIV1 P24 AG SERPL QL IA: NORMAL

## 2024-01-19 PROCEDURE — 87389 HIV-1 AG W/HIV-1&-2 AB AG IA: CPT | Performed by: NURSE PRACTITIONER

## 2024-01-19 PROCEDURE — 86803 HEPATITIS C AB TEST: CPT | Performed by: NURSE PRACTITIONER

## 2024-01-19 PROCEDURE — 36415 COLL VENOUS BLD VENIPUNCTURE: CPT | Mod: PO | Performed by: NURSE PRACTITIONER

## 2024-01-24 ENCOUNTER — HOSPITAL ENCOUNTER (OUTPATIENT)
Dept: RADIOLOGY | Facility: HOSPITAL | Age: 49
Discharge: HOME | End: 2024-01-24
Attending: INTERNAL MEDICINE
Payer: COMMERCIAL

## 2024-01-24 ENCOUNTER — PATIENT MESSAGE (OUTPATIENT)
Dept: FAMILY MEDICINE | Facility: CLINIC | Age: 49
End: 2024-01-24

## 2024-01-24 ENCOUNTER — OFFICE VISIT (OUTPATIENT)
Dept: FAMILY MEDICINE | Facility: CLINIC | Age: 49
End: 2024-01-24
Payer: COMMERCIAL

## 2024-01-24 VITALS
RESPIRATION RATE: 18 BRPM | BODY MASS INDEX: 29.88 KG/M2 | HEIGHT: 70 IN | HEART RATE: 80 BPM | OXYGEN SATURATION: 97 % | SYSTOLIC BLOOD PRESSURE: 130 MMHG | DIASTOLIC BLOOD PRESSURE: 80 MMHG | TEMPERATURE: 98 F | WEIGHT: 208.69 LBS

## 2024-01-24 DIAGNOSIS — R05.2 SUBACUTE COUGH: ICD-10-CM

## 2024-01-24 DIAGNOSIS — J31.0 CHRONIC RHINITIS: ICD-10-CM

## 2024-01-24 DIAGNOSIS — J20.0 ACUTE BRONCHITIS DUE TO MYCOPLASMA PNEUMONIAE: ICD-10-CM

## 2024-01-24 DIAGNOSIS — R05.2 SUBACUTE COUGH: Primary | ICD-10-CM

## 2024-01-24 DIAGNOSIS — K21.9 GASTROESOPHAGEAL REFLUX DISEASE WITHOUT ESOPHAGITIS: ICD-10-CM

## 2024-01-24 PROCEDURE — 99214 OFFICE O/P EST MOD 30 MIN: CPT | Mod: S$GLB,,, | Performed by: INTERNAL MEDICINE

## 2024-01-24 PROCEDURE — 1159F MED LIST DOCD IN RCRD: CPT | Mod: CPTII,S$GLB,, | Performed by: INTERNAL MEDICINE

## 2024-01-24 PROCEDURE — 71046 X-RAY EXAM CHEST 2 VIEWS: CPT | Mod: TC,FY,PO

## 2024-01-24 PROCEDURE — 3079F DIAST BP 80-89 MM HG: CPT | Mod: CPTII,S$GLB,, | Performed by: INTERNAL MEDICINE

## 2024-01-24 PROCEDURE — 3075F SYST BP GE 130 - 139MM HG: CPT | Mod: CPTII,S$GLB,, | Performed by: INTERNAL MEDICINE

## 2024-01-24 PROCEDURE — 1160F RVW MEDS BY RX/DR IN RCRD: CPT | Mod: CPTII,S$GLB,, | Performed by: INTERNAL MEDICINE

## 2024-01-24 PROCEDURE — 3008F BODY MASS INDEX DOCD: CPT | Mod: CPTII,S$GLB,, | Performed by: INTERNAL MEDICINE

## 2024-01-24 PROCEDURE — 71046 X-RAY EXAM CHEST 2 VIEWS: CPT | Mod: 26,,, | Performed by: RADIOLOGY

## 2024-01-24 RX ORDER — AZITHROMYCIN 250 MG/1
TABLET, FILM COATED ORAL
Qty: 6 TABLET | Refills: 0 | Status: SHIPPED | OUTPATIENT
Start: 2024-01-24 | End: 2024-01-29

## 2024-01-24 RX ORDER — PANTOPRAZOLE SODIUM 40 MG/1
40 TABLET, DELAYED RELEASE ORAL DAILY
Qty: 30 TABLET | Refills: 11 | Status: SHIPPED | OUTPATIENT
Start: 2024-01-24 | End: 2025-01-23

## 2024-01-24 RX ORDER — FLUTICASONE PROPIONATE 50 MCG
2 SPRAY, SUSPENSION (ML) NASAL DAILY
Qty: 16 G | Refills: 6 | Status: SHIPPED | OUTPATIENT
Start: 2024-01-24

## 2024-01-24 NOTE — PROGRESS NOTES
"Subjective:       Patient ID: Logan Vaz is a 48 y.o. male.        Chief Complaint: Cough (Since December )  He presents with 2 months of continued coughing. He started with flu like symptoms in early December and he eventually improved except a lingering cough.  He was seen on 1/11/2024 by a provider and given prednisone taper over 6 days. He does not report any improvement of symptoms with the steroids. His cough worsened in the last week changing from dry to a wet deeper cough. His cough is productive of brownish sputum. He does not feel any PND but he does have some congestion. No fevers. No headaches. No wheezing or increase work of breathing. He does not smoke. No ear pain or sore throat. He has not had any imaging. He is taking mucinex OTC.  He does have intermittent reflux that can cause coughing spells but it is different from his current cough.     Review of Systems   Constitutional:  Negative for activity change, appetite change, chills, fatigue and fever.   HENT:  Positive for congestion. Negative for ear discharge, ear pain, mouth sores, postnasal drip, rhinorrhea, sinus pressure and sore throat.    Eyes:  Negative for pain, discharge and redness.   Respiratory:  Positive for cough. Negative for chest tightness, shortness of breath and wheezing.    Gastrointestinal:  Negative for abdominal pain, constipation, diarrhea, nausea and vomiting.   Genitourinary:  Negative for dysuria.   Musculoskeletal:  Negative for arthralgias and neck stiffness.   Skin:  Negative for rash.   Neurological:  Negative for headaches.   Hematological:  Negative for adenopathy.       Objective:      Vitals:    01/24/24 0828   BP: 130/80   Pulse: 80   Resp: 18   Temp: 98.2 °F (36.8 °C)   SpO2: 97%   Weight: 94.7 kg (208 lb 10.7 oz)   Height: 5' 10" (1.778 m)     Body mass index is 29.94 kg/m².  Physical Exam    General appearance: alert, no acute distress  Head: atraumatic  Eyes: PERRL, EMOI, normal conjunctiva, no " drainage  Ears: tm normal with good visualization of landmarks, no erythema or pus, canals normal, external ear normal  Nose: erythematous boggy mucosa, no polyps or sores, clear rhinorrhea  Throat: no erythema, no exudates, tonsils appear normal  Mouth: no sores or lesion, moist mucous membranes  Neck: supple, FROM, no masses, no tenderness  Lymph: no posterior or cervical adenopathy  Lungs: no distress, no retractions, clear to ascultation bilaterally, no wheezing, no rales, no rhonchi  Heart:: Regular rate and rhythm, no murmur  Abdomen: soft, non-tender, no guarding, no rebound, no peritoneal signs, bowel sounds normal, no hepatosplenomegaly, no masses  Skin: no rashes or lesion  Perfusion: good capillary refill, normal pulses    Assessment:       1. Subacute cough    2. Chronic rhinitis    3. Acute bronchitis due to Mycoplasma pneumoniae    4. Gastroesophageal reflux disease without esophagitis        Plan:       Subacute cough  Multifactorial but now concern for a secondary infection. Will get CXR since he has been coughing for 2 months. Start treatment below.   -     X-Ray Chest PA And Lateral; Future; Expected date: 01/24/2024    Chronic rhinitis  Uncontrolled and will start flonase 2 SEN qhs and use nasal saline during the day. He will continue for 3 weeks and if no improvement will f/u.   -     fluticasone propionate (FLONASE) 50 mcg/actuation nasal spray; 2 sprays (100 mcg total) by Each Nostril route once daily.  Dispense: 16 g; Refill: 6    Acute bronchitis due to Mycoplasma pneumoniae  Concern for walking pneumonia and will start azithromycin.   -     azithromycin (Z-MEHNAZ) 250 MG tablet; Take 2 tablets by mouth on day 1; Take 1 tablet by mouth on days 2-5  Dispense: 6 tablet; Refill: 0    Gastroesophageal reflux disease without esophagitis  He does have intermittent reflux symptoms so will have him take treatment daily for 3 weeks to see if symptoms clear.   -     pantoprazole (PROTONIX) 40 MG tablet;  Take 1 tablet (40 mg total) by mouth once daily.  Dispense: 30 tablet; Refill: 11    Follow up in about 1 year (around 1/24/2025) for annual exam.

## 2024-02-07 ENCOUNTER — PATIENT MESSAGE (OUTPATIENT)
Dept: ADMINISTRATIVE | Facility: HOSPITAL | Age: 49
End: 2024-02-07
Payer: COMMERCIAL

## 2024-02-07 ENCOUNTER — PATIENT OUTREACH (OUTPATIENT)
Dept: ADMINISTRATIVE | Facility: HOSPITAL | Age: 49
End: 2024-02-07
Payer: COMMERCIAL

## 2024-02-15 ENCOUNTER — TELEPHONE (OUTPATIENT)
Dept: GASTROENTEROLOGY | Facility: CLINIC | Age: 49
End: 2024-02-15
Payer: COMMERCIAL

## 2024-02-15 NOTE — TELEPHONE ENCOUNTER
Diagnosis is confirmed from the case order. Screening  BMI: 29.94  First Colonoscopy? No (no polyps)  Family history of colon cancer? no  Medical issues? no  Blood thinners? no  Preferred day: Fridays.    Mother will take pt home after the procedure.   I also inform pt the exact arrival time will be provided to him by the surgery center a couple of days to the afternoon prior to the procedure date.  Inform pt I will send prep instructions via [employer] and mail (address confirmed).   Pt verbalizes understanding to the statements above.

## 2024-03-12 ENCOUNTER — OFFICE VISIT (OUTPATIENT)
Dept: FAMILY MEDICINE | Facility: CLINIC | Age: 49
End: 2024-03-12
Payer: COMMERCIAL

## 2024-03-12 VITALS
TEMPERATURE: 98 F | HEART RATE: 90 BPM | OXYGEN SATURATION: 98 % | WEIGHT: 211.56 LBS | HEIGHT: 70 IN | DIASTOLIC BLOOD PRESSURE: 80 MMHG | RESPIRATION RATE: 16 BRPM | SYSTOLIC BLOOD PRESSURE: 122 MMHG | BODY MASS INDEX: 30.29 KG/M2

## 2024-03-12 DIAGNOSIS — G04.00 ADEM (ACUTE DISSEMINATED ENCEPHALOMYELITIS): ICD-10-CM

## 2024-03-12 DIAGNOSIS — J30.9 CHRONIC ALLERGIC RHINITIS: ICD-10-CM

## 2024-03-12 DIAGNOSIS — E66.09 CLASS 1 OBESITY DUE TO EXCESS CALORIES WITH SERIOUS COMORBIDITY AND BODY MASS INDEX (BMI) OF 30.0 TO 30.9 IN ADULT: ICD-10-CM

## 2024-03-12 DIAGNOSIS — Z00.00 LABORATORY EXAMINATION ORDERED AS PART OF A COMPLETE PHYSICAL EXAMINATION: ICD-10-CM

## 2024-03-12 DIAGNOSIS — K21.9 GASTROESOPHAGEAL REFLUX DISEASE WITHOUT ESOPHAGITIS: ICD-10-CM

## 2024-03-12 DIAGNOSIS — J06.9 UPPER RESPIRATORY TRACT INFECTION, UNSPECIFIED TYPE: ICD-10-CM

## 2024-03-12 DIAGNOSIS — Z00.00 WELL ADULT EXAM: Primary | ICD-10-CM

## 2024-03-12 DIAGNOSIS — E78.5 HYPERLIPIDEMIA, UNSPECIFIED HYPERLIPIDEMIA TYPE: ICD-10-CM

## 2024-03-12 LAB
CTP QC/QA: YES
CTP QC/QA: YES
POC MOLECULAR INFLUENZA A AGN: NEGATIVE
POC MOLECULAR INFLUENZA B AGN: NEGATIVE
SARS-COV-2 RDRP RESP QL NAA+PROBE: NEGATIVE

## 2024-03-12 PROCEDURE — 87635 SARS-COV-2 COVID-19 AMP PRB: CPT | Mod: QW,S$GLB,, | Performed by: INTERNAL MEDICINE

## 2024-03-12 PROCEDURE — 99396 PREV VISIT EST AGE 40-64: CPT | Mod: S$GLB,,, | Performed by: INTERNAL MEDICINE

## 2024-03-12 PROCEDURE — 87502 INFLUENZA DNA AMP PROBE: CPT | Mod: QW,,, | Performed by: INTERNAL MEDICINE

## 2024-03-12 PROCEDURE — 3079F DIAST BP 80-89 MM HG: CPT | Mod: CPTII,S$GLB,, | Performed by: INTERNAL MEDICINE

## 2024-03-12 PROCEDURE — 3074F SYST BP LT 130 MM HG: CPT | Mod: CPTII,S$GLB,, | Performed by: INTERNAL MEDICINE

## 2024-03-12 PROCEDURE — 1160F RVW MEDS BY RX/DR IN RCRD: CPT | Mod: CPTII,S$GLB,, | Performed by: INTERNAL MEDICINE

## 2024-03-12 PROCEDURE — 1159F MED LIST DOCD IN RCRD: CPT | Mod: CPTII,S$GLB,, | Performed by: INTERNAL MEDICINE

## 2024-03-12 PROCEDURE — 3008F BODY MASS INDEX DOCD: CPT | Mod: CPTII,S$GLB,, | Performed by: INTERNAL MEDICINE

## 2024-03-12 NOTE — PROGRESS NOTES
Subjective:       Patient ID: Logan Vaz is a 48 y.o. male.    Medication List with Changes/Refills   Current Medications    FLUTICASONE PROPIONATE (FLONASE) 50 MCG/ACTUATION NASAL SPRAY    2 sprays (100 mcg total) by Each Nostril route once daily.    PANTOPRAZOLE (PROTONIX) 40 MG TABLET    Take 1 tablet (40 mg total) by mouth once daily.       Chief Complaint: Cough, Nasal Congestion, and Ear Fullness  He is here today for an annual exam.     He presents with one week of fluid in his ears left > right and started with mild coughing yesterday. He has mild congestion. No fevers. No sinus pressure or headaches. No ear pain but rather fullness. No wheezing.  He was seen on 1/24/2024 for chronic coughing. CXR was clear and he was started on flonase and azithromycin. His symptoms improved until repeat coughing yesterday.     He has hyperlipidemia with a family history of early heart disease. He is not on treatment. Lipids on 12/2021 were 229/141/39/162.  CT calcium score on 12/2021 was 0.     He has GERD and is doing well on pantoprazole 40 mg daily. He denies any active symptoms.     He has a history of ADEM which was mistaken for MS.  He was followed by neurology and has had serial MRIs that are unchanged. He was given reassurance and denies any recurrent symptoms (facial numbness with dysgeusia).      He lives with his wife and children. 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  He does not exercise but stays active. He does eat healthy.     Colonoscopy---none scheduled   Tdap---3/2016   Influenza vaccine---refused   Covid vaccine--- 2 doses     Review of Systems   Constitutional:  Negative for appetite change, fatigue, fever and unexpected weight change.   HENT:  Positive for congestion and ear pain. Negative for hearing loss, sore throat and trouble swallowing.    Eyes:  Negative for pain and visual disturbance.   Respiratory:  Positive for cough. Negative for chest tightness, shortness of  "breath and wheezing.    Cardiovascular:  Negative for chest pain, palpitations and leg swelling.   Gastrointestinal:  Negative for abdominal pain, blood in stool, constipation, diarrhea, nausea and vomiting.   Endocrine: Negative for polyuria.   Genitourinary:  Negative for dysuria and hematuria.   Musculoskeletal:  Negative for arthralgias, back pain and myalgias.   Skin:  Negative for rash.   Neurological:  Negative for dizziness, weakness, numbness and headaches.   Hematological:  Does not bruise/bleed easily.   Psychiatric/Behavioral:  Negative for dysphoric mood, sleep disturbance and suicidal ideas. The patient is not nervous/anxious.        Objective:      Vitals:    03/12/24 1131   BP: 122/80   BP Location: Right arm   Patient Position: Sitting   BP Method: X-Large (Manual)   Pulse: 90   Resp: 16   Temp: 98.1 °F (36.7 °C)   SpO2: 98%   Weight: 95.9 kg (211 lb 8.5 oz)   Height: 5' 10" (1.778 m)     Body mass index is 30.35 kg/m².  Physical Exam    General appearance: No acute distress, cooperative  Eyes: PERRL, EOMI, conjunctiva clear  Ears: normal external ear and pinna, tm clear without drainage on the right, clear fluid on the left, canals clear  Nose:boggy mucosa without drainage  Throat: no exudates or erythema, tonsils not enlarged  Mouth: no sores or lesions, moist mucous membranes  Neck: FROM, soft, supple, no thyromegaly, no bruits  Lymph: no anterior or posterior cervical adenopathy  Heart::  Regular rate and rhythm, no murmur  Lung: Clear to ascultation bilaterally, no wheezing, no rales, no rhonchi, no distress  Abdomen: Soft, nontender, no distention, no hepatosplenomegaly, bowel sounds normal, no guarding, no rebound, no peritoneal signs  Skin: no rashes, no lesions  Extremities: no edema, no cyanosis  Neuro: CN 2-12 intact, 5/5 muscle strength upper and lower extremity bilaterally, 2+ DTRs UE and LE bilaterally, normal gait  Peripheral pulses: 2+ pedal pulses bilaterally, good perfusion and " color  Musculoskeletal: FROM, good strenth, no tenderness  Joint: normal appearance, no swelling, no warmth, no deformity in all joints    Assessment:       1. Well adult exam    2. Upper respiratory tract infection, unspecified type    3. Chronic allergic rhinitis    4. Hyperlipidemia, unspecified hyperlipidemia type    5. Gastroesophageal reflux disease without esophagitis    6. ADEM (acute disseminated encephalomyelitis)    7. Class 1 obesity due to excess calories with serious comorbidity and body mass index (BMI) of 30.0 to 30.9 in adult    8. Laboratory examination ordered as part of a complete physical examination        Plan:       Well adult exam  He is due for labs today. Scheduled for colonoscopy. He is UTD with vaccines    Upper respiratory tract infection, unspecified type  Reassurance and supportive care. No need for antibiotics at this point. Advised of the signs of worsening to return to clinic.    -     POCT COVID-19 Rapid Screening  -     POCT Influenza A/B Molecular    Chronic allergic rhinitis  Uncontrolled and advised to use OTC antihistamines and continue on flonase. This will help with left ear symptoms. Okay to use nasal saline for irrigation.     Hyperlipidemia, unspecified hyperlipidemia type  Uncontrolled and due to recheck lipids. He is not on treatment.     Gastroesophageal reflux disease without esophagitis  Well controlled and continue current regimen.     History of ADEM (acute disseminated encephalomyelitis)  No recurrent symptoms and he is doing very well    Class 1 obesity due to excess calories with serious comorbidity and body mass index (BMI) of 30.0 to 30.9 in adult  Long discussion on the benefits of healthy eating and regular exercise to help lose weight and help control hyperlipidemia.     Laboratory examination ordered as part of a complete physical examination  -     CBC Auto Differential; Future; Expected date: 03/12/2024  -     Comprehensive Metabolic Panel; Future;  Expected date: 03/12/2024  -     Lipid Panel; Future; Expected date: 03/12/2024  -     TSH; Future; Expected date: 03/12/2024  -     Hemoglobin A1C; Future; Expected date: 03/12/2024    Follow up in about 1 year (around 3/12/2025) for annual exam.

## 2024-03-20 ENCOUNTER — TELEPHONE (OUTPATIENT)
Dept: FAMILY MEDICINE | Facility: CLINIC | Age: 49
End: 2024-03-20

## 2024-03-20 DIAGNOSIS — J34.9 SINUS PROBLEM: Primary | ICD-10-CM

## 2024-05-17 ENCOUNTER — TELEPHONE (OUTPATIENT)
Dept: GASTROENTEROLOGY | Facility: CLINIC | Age: 49
End: 2024-05-17
Payer: COMMERCIAL

## 2024-05-17 ENCOUNTER — TELEPHONE (OUTPATIENT)
Dept: SURGERY | Facility: CLINIC | Age: 49
End: 2024-05-17
Payer: COMMERCIAL

## 2024-05-17 NOTE — TELEPHONE ENCOUNTER
----- Message from Mar Hall sent at 5/17/2024 11:20 AM CDT -----  Contact: Self  Pt is calling in regards to his procedure on 05/24, stated he will not be able to make that appt and needs to reschedule to another day. Can we please cancel the one and call pt back at [phone] to reschedule. Thank You

## 2024-05-17 NOTE — TELEPHONE ENCOUNTER
----- Message from Charlie Siegel LPN sent at 5/17/2024 12:56 PM CDT -----  Contact: Self    ----- Message -----  From: Mar Hall  Sent: 5/17/2024  11:22 AM CDT  To: Nicholas HAMMOND Staff    Pt is calling in regards to his procedure on 05/24, stated he will not be able to make that appt and needs to reschedule to another day. Can we please cancel the one and call pt back at [phone] to reschedule. Thank You

## 2024-12-28 ENCOUNTER — E-VISIT (OUTPATIENT)
Dept: FAMILY MEDICINE | Facility: CLINIC | Age: 49
End: 2024-12-28
Payer: COMMERCIAL

## 2024-12-28 DIAGNOSIS — J01.00 ACUTE NON-RECURRENT MAXILLARY SINUSITIS: Primary | ICD-10-CM

## 2024-12-31 RX ORDER — AZITHROMYCIN 250 MG/1
TABLET, FILM COATED ORAL
Qty: 6 TABLET | Refills: 0 | Status: SHIPPED | OUTPATIENT
Start: 2024-12-31 | End: 2025-01-05

## 2024-12-31 NOTE — PROGRESS NOTES
Patient ID: Logan Vaz is a 49 y.o. male.    Chief Complaint: General Illness (Entered automatically based on patient selection in [employer].)    The patient initiated a request through [employer] on 12/28/2024 for evaluation and management with a chief complaint of General Illness (Entered automatically based on patient selection in [employer].)     I evaluated the questionnaire submission on 12/31/2024.    [facility] Evisit [facility]-Cough And Cold    12/28/2024  2:03 PM CST - Filed by Patient   What do you need help with? Cough, Cold, Sore Throat   Do you agree to participate in an E-Visit? Yes   If you have any of the following symptoms, go to your local emergency room or call 911: I acknowledge   What is the main issue you would like addressed today? Cough. No fever. Seems like an upper respiratory infection   Do you think you might have COVID or the Flu? No   Have you tested positive for COVID or Flu? No   What symptoms do you currently have?  Cough   Describe your cough: Productive (containing mucus)   Describe the mucus: Green   Have you ever smoked? I smoked in the past   Have you had a fever? No   When did your symptoms first appear? 12/24/2024   In the last two weeks, have you been in close contact with someone who has COVID-19 or the Flu? No   List what you have done or taken to help your symptoms. Cough medicine, cough drop, mucinex   How severe are your symptoms? Mild   Have your symptoms gotten better or worse since they started?  Worse   Do you have transportation to get testing if it is needed and ordered for you at an [facility] location? Yes   Provide any additional information you feel is important. Just a cough. Green mucus. It comes up at times. No fever or aches   Please attach any relevant images or files    Are you able to take your vital signs? No         Encounter Diagnosis   Name Primary?    Acute non-recurrent maxillary sinusitis Yes     I sent follow up questions. If symptoms still present  then will start treatment with antibiotics but if symptoms resolved then will give reassurance.      No orders of the defined types were placed in this encounter.           Follow up for sent follow up questions .      E-Visit Time Trackin minutes

## 2025-01-06 ENCOUNTER — TELEPHONE (OUTPATIENT)
Dept: FAMILY MEDICINE | Facility: CLINIC | Age: 50
End: 2025-01-06
Payer: COMMERCIAL

## 2025-01-07 ENCOUNTER — OFFICE VISIT (OUTPATIENT)
Dept: FAMILY MEDICINE | Facility: CLINIC | Age: 50
End: 2025-01-07
Payer: COMMERCIAL

## 2025-01-07 VITALS
HEART RATE: 88 BPM | RESPIRATION RATE: 18 BRPM | BODY MASS INDEX: 31.42 KG/M2 | OXYGEN SATURATION: 98 % | DIASTOLIC BLOOD PRESSURE: 75 MMHG | HEIGHT: 70 IN | SYSTOLIC BLOOD PRESSURE: 120 MMHG | WEIGHT: 219.44 LBS | TEMPERATURE: 98 F

## 2025-01-07 DIAGNOSIS — H69.92 ACUTE DYSFUNCTION OF LEFT EUSTACHIAN TUBE: Primary | ICD-10-CM

## 2025-01-07 DIAGNOSIS — Z00.00 LABORATORY EXAMINATION ORDERED AS PART OF A COMPLETE PHYSICAL EXAMINATION: ICD-10-CM

## 2025-01-07 PROCEDURE — G2211 COMPLEX E/M VISIT ADD ON: HCPCS | Mod: S$GLB,,, | Performed by: INTERNAL MEDICINE

## 2025-01-07 PROCEDURE — 3074F SYST BP LT 130 MM HG: CPT | Mod: CPTII,S$GLB,, | Performed by: INTERNAL MEDICINE

## 2025-01-07 PROCEDURE — 99213 OFFICE O/P EST LOW 20 MIN: CPT | Mod: S$GLB,,, | Performed by: INTERNAL MEDICINE

## 2025-01-07 PROCEDURE — 1160F RVW MEDS BY RX/DR IN RCRD: CPT | Mod: CPTII,S$GLB,, | Performed by: INTERNAL MEDICINE

## 2025-01-07 PROCEDURE — 3078F DIAST BP <80 MM HG: CPT | Mod: CPTII,S$GLB,, | Performed by: INTERNAL MEDICINE

## 2025-01-07 PROCEDURE — 1159F MED LIST DOCD IN RCRD: CPT | Mod: CPTII,S$GLB,, | Performed by: INTERNAL MEDICINE

## 2025-01-07 PROCEDURE — 3008F BODY MASS INDEX DOCD: CPT | Mod: CPTII,S$GLB,, | Performed by: INTERNAL MEDICINE

## 2025-01-07 RX ORDER — PREDNISONE 20 MG/1
40 TABLET ORAL DAILY
Qty: 6 TABLET | Refills: 0 | Status: SHIPPED | OUTPATIENT
Start: 2025-01-07 | End: 2025-01-10

## 2025-01-07 NOTE — PROGRESS NOTES
"Subjective:       Patient ID: Logan Vaz is a 49 y.o. male.    Medication List with Changes/Refills   New Medications    PREDNISONE (DELTASONE) 20 MG TABLET    Take 2 tablets (40 mg total) by mouth once daily. for 3 days   Current Medications    FLUTICASONE PROPIONATE (FLONASE) 50 MCG/ACTUATION NASAL SPRAY    2 sprays (100 mcg total) by Each Nostril route once daily.    PANTOPRAZOLE (PROTONIX) 40 MG TABLET    Take 1 tablet (40 mg total) by mouth once daily.       Chief Complaint: Cough and Otalgia (L ear hurting since yesterday and the cough has been going on for about 2wks.)  He presents with new left ear pain for 2 days.     He started with coughing and congestion about 2 weeks ago. He was having thick green sputum. He was treated with azithromycin and his congestion improved. His coughing is also improved and now he has a small dry cough that lingers. He started yesterday with sharp pain in his left ear and it "just hurts".  Worse with eating and drinking.  No drainage. No new fevers.     Review of Systems   Constitutional:  Negative for activity change, appetite change, chills, fatigue and fever.   HENT:  Positive for congestion and ear pain. Negative for ear discharge, mouth sores, postnasal drip, rhinorrhea, sinus pressure and sore throat.    Eyes:  Negative for pain, discharge and redness.   Respiratory:  Negative for cough, chest tightness, shortness of breath and wheezing.    Gastrointestinal:  Negative for abdominal pain, constipation, diarrhea, nausea and vomiting.   Genitourinary:  Negative for dysuria.   Musculoskeletal:  Negative for arthralgias and neck stiffness.   Skin:  Negative for rash.   Neurological:  Negative for headaches.   Hematological:  Negative for adenopathy.       Objective:      Vitals:    01/07/25 1003   BP: 120/75   BP Location: Right arm   Patient Position: Sitting   Pulse: 88   Resp: 18   Temp: 98.2 °F (36.8 °C)   TempSrc: Temporal   SpO2: 98%   Weight: 99.6 kg (219 lb 7.5 " "oz)   Height: 5' 10" (1.778 m)     Body mass index is 31.49 kg/m².  Physical Exam    General appearance: alert, no acute distress  Head: atraumatic  Eyes: PERRL, EMOI, normal conjunctiva, no drainage  Ears: bilateral tm bulging with clear fluid no erythema or pus, canals normal, external ear normal  Nose: erythematous mucosa, no polyps or sores, no rhinorrhea  Throat: no erythema, no exudates, tonsils appear normal  Mouth: no sores or lesion, moist mucous membranes  Neck: supple, FROM, no masses, no tenderness  Lymph: no posterior or cervical adenopathy  Lungs: no distress, no retractions, clear to ascultation bilaterally, no wheezing, no rales, no rhonchi  Heart:: Regular rate and rhythm, no murmur  Abdomen: soft, non-tender, no guarding, no rebound, no peritoneal signs, bowel sounds normal, no hepatosplenomegaly, no masses  Skin: no rashes or lesion  Perfusion: good capillary refill, normal pulses    Assessment:       1. Acute dysfunction of left eustachian tube    2. Laboratory examination ordered as part of a complete physical examination        Plan:       Acute dysfunction of left eustachian tube  No evidence of infection on exam today.  Suspect eustachian tube dysfunction. Will treat with 3 days of prednisone 40 mg.  Advised to continue flonase and start nasal saline rinses.  Okay to use sudafed to help dry up fluid.   -     predniSONE (DELTASONE) 20 MG tablet; Take 2 tablets (40 mg total) by mouth once daily. for 3 days  Dispense: 6 tablet; Refill: 0    Laboratory examination ordered as part of a complete physical examination  -     CBC Auto Differential; Future; Expected date: 01/07/2025  -     Comprehensive Metabolic Panel; Future; Expected date: 01/07/2025  -     Lipid Panel; Future; Expected date: 01/07/2025  -     TSH; Future; Expected date: 01/07/2025  -     Hemoglobin A1C; Future; Expected date: 01/07/2025    Follow up for annual exam.        "

## 2025-01-14 ENCOUNTER — LAB VISIT (OUTPATIENT)
Dept: LAB | Facility: HOSPITAL | Age: 50
End: 2025-01-14
Attending: INTERNAL MEDICINE
Payer: COMMERCIAL

## 2025-01-14 DIAGNOSIS — Z00.00 LABORATORY EXAMINATION ORDERED AS PART OF A COMPLETE PHYSICAL EXAMINATION: ICD-10-CM

## 2025-01-14 LAB
ALBUMIN SERPL BCP-MCNC: 4 G/DL (ref 3.5–5.2)
ALP SERPL-CCNC: 141 U/L (ref 40–150)
ALT SERPL W/O P-5'-P-CCNC: 65 U/L (ref 10–44)
ANION GAP SERPL CALC-SCNC: 9 MMOL/L (ref 8–16)
AST SERPL-CCNC: 25 U/L (ref 10–40)
BASOPHILS # BLD AUTO: 0.03 K/UL (ref 0–0.2)
BASOPHILS NFR BLD: 0.4 % (ref 0–1.9)
BILIRUB SERPL-MCNC: 0.7 MG/DL (ref 0.1–1)
BUN SERPL-MCNC: 13 MG/DL (ref 6–20)
CALCIUM SERPL-MCNC: 9.1 MG/DL (ref 8.7–10.5)
CHLORIDE SERPL-SCNC: 107 MMOL/L (ref 95–110)
CHOLEST SERPL-MCNC: 224 MG/DL (ref 120–199)
CHOLEST/HDLC SERPL: 6.2 {RATIO} (ref 2–5)
CO2 SERPL-SCNC: 23 MMOL/L (ref 23–29)
CREAT SERPL-MCNC: 1.1 MG/DL (ref 0.5–1.4)
DIFFERENTIAL METHOD BLD: NORMAL
EOSINOPHIL # BLD AUTO: 0.1 K/UL (ref 0–0.5)
EOSINOPHIL NFR BLD: 1.3 % (ref 0–8)
ERYTHROCYTE [DISTWIDTH] IN BLOOD BY AUTOMATED COUNT: 12.8 % (ref 11.5–14.5)
EST. GFR  (NO RACE VARIABLE): >60 ML/MIN/1.73 M^2
ESTIMATED AVG GLUCOSE: 108 MG/DL (ref 68–131)
GLUCOSE SERPL-MCNC: 103 MG/DL (ref 70–110)
HBA1C MFR BLD: 5.4 % (ref 4–5.6)
HCT VFR BLD AUTO: 48.2 % (ref 40–54)
HDLC SERPL-MCNC: 36 MG/DL (ref 40–75)
HDLC SERPL: 16.1 % (ref 20–50)
HGB BLD-MCNC: 16.4 G/DL (ref 14–18)
IMM GRANULOCYTES # BLD AUTO: 0.02 K/UL (ref 0–0.04)
IMM GRANULOCYTES NFR BLD AUTO: 0.3 % (ref 0–0.5)
LDLC SERPL CALC-MCNC: 151.2 MG/DL (ref 63–159)
LYMPHOCYTES # BLD AUTO: 1.9 K/UL (ref 1–4.8)
LYMPHOCYTES NFR BLD: 27.7 % (ref 18–48)
MCH RBC QN AUTO: 29.2 PG (ref 27–31)
MCHC RBC AUTO-ENTMCNC: 34 G/DL (ref 32–36)
MCV RBC AUTO: 86 FL (ref 82–98)
MONOCYTES # BLD AUTO: 0.7 K/UL (ref 0.3–1)
MONOCYTES NFR BLD: 10.2 % (ref 4–15)
NEUTROPHILS # BLD AUTO: 4.2 K/UL (ref 1.8–7.7)
NEUTROPHILS NFR BLD: 60.1 % (ref 38–73)
NONHDLC SERPL-MCNC: 188 MG/DL
NRBC BLD-RTO: 0 /100 WBC
PLATELET # BLD AUTO: 222 K/UL (ref 150–450)
PMV BLD AUTO: 10.8 FL (ref 9.2–12.9)
POTASSIUM SERPL-SCNC: 4.1 MMOL/L (ref 3.5–5.1)
PROT SERPL-MCNC: 7.4 G/DL (ref 6–8.4)
RBC # BLD AUTO: 5.62 M/UL (ref 4.6–6.2)
SODIUM SERPL-SCNC: 139 MMOL/L (ref 136–145)
TRIGL SERPL-MCNC: 184 MG/DL (ref 30–150)
TSH SERPL DL<=0.005 MIU/L-ACNC: 1.21 UIU/ML (ref 0.4–4)
WBC # BLD AUTO: 6.98 K/UL (ref 3.9–12.7)

## 2025-01-14 PROCEDURE — 80061 LIPID PANEL: CPT | Performed by: INTERNAL MEDICINE

## 2025-01-14 PROCEDURE — 85025 COMPLETE CBC W/AUTO DIFF WBC: CPT | Performed by: INTERNAL MEDICINE

## 2025-01-14 PROCEDURE — 84443 ASSAY THYROID STIM HORMONE: CPT | Performed by: INTERNAL MEDICINE

## 2025-01-14 PROCEDURE — 36415 COLL VENOUS BLD VENIPUNCTURE: CPT | Mod: PO | Performed by: INTERNAL MEDICINE

## 2025-01-14 PROCEDURE — 83036 HEMOGLOBIN GLYCOSYLATED A1C: CPT | Performed by: INTERNAL MEDICINE

## 2025-01-14 PROCEDURE — 80053 COMPREHEN METABOLIC PANEL: CPT | Performed by: INTERNAL MEDICINE

## 2025-01-16 ENCOUNTER — PATIENT MESSAGE (OUTPATIENT)
Dept: FAMILY MEDICINE | Facility: CLINIC | Age: 50
End: 2025-01-16
Payer: COMMERCIAL

## 2025-01-21 ENCOUNTER — TELEPHONE (OUTPATIENT)
Dept: FAMILY MEDICINE | Facility: CLINIC | Age: 50
End: 2025-01-21
Payer: COMMERCIAL

## 2025-01-23 ENCOUNTER — OFFICE VISIT (OUTPATIENT)
Dept: FAMILY MEDICINE | Facility: CLINIC | Age: 50
End: 2025-01-23
Payer: COMMERCIAL

## 2025-01-23 VITALS
HEIGHT: 70 IN | RESPIRATION RATE: 18 BRPM | BODY MASS INDEX: 30.9 KG/M2 | WEIGHT: 215.81 LBS | DIASTOLIC BLOOD PRESSURE: 80 MMHG | SYSTOLIC BLOOD PRESSURE: 100 MMHG | HEART RATE: 84 BPM | TEMPERATURE: 98 F | OXYGEN SATURATION: 99 %

## 2025-01-23 DIAGNOSIS — Z00.00 WELL ADULT EXAM: Primary | ICD-10-CM

## 2025-01-23 DIAGNOSIS — E66.09 CLASS 1 OBESITY DUE TO EXCESS CALORIES WITH SERIOUS COMORBIDITY AND BODY MASS INDEX (BMI) OF 30.0 TO 30.9 IN ADULT: ICD-10-CM

## 2025-01-23 DIAGNOSIS — K52.9 CHRONIC DIARRHEA: ICD-10-CM

## 2025-01-23 DIAGNOSIS — J30.9 CHRONIC ALLERGIC RHINITIS: ICD-10-CM

## 2025-01-23 DIAGNOSIS — K21.9 GASTROESOPHAGEAL REFLUX DISEASE WITHOUT ESOPHAGITIS: ICD-10-CM

## 2025-01-23 DIAGNOSIS — E66.811 CLASS 1 OBESITY DUE TO EXCESS CALORIES WITH SERIOUS COMORBIDITY AND BODY MASS INDEX (BMI) OF 30.0 TO 30.9 IN ADULT: ICD-10-CM

## 2025-01-23 DIAGNOSIS — Z12.11 SCREEN FOR COLON CANCER: ICD-10-CM

## 2025-01-23 DIAGNOSIS — R79.89 ELEVATED LFTS: ICD-10-CM

## 2025-01-23 DIAGNOSIS — E78.5 HYPERLIPIDEMIA, UNSPECIFIED HYPERLIPIDEMIA TYPE: ICD-10-CM

## 2025-01-23 PROCEDURE — 1160F RVW MEDS BY RX/DR IN RCRD: CPT | Mod: CPTII,S$GLB,, | Performed by: INTERNAL MEDICINE

## 2025-01-23 PROCEDURE — 3079F DIAST BP 80-89 MM HG: CPT | Mod: CPTII,S$GLB,, | Performed by: INTERNAL MEDICINE

## 2025-01-23 PROCEDURE — 3008F BODY MASS INDEX DOCD: CPT | Mod: CPTII,S$GLB,, | Performed by: INTERNAL MEDICINE

## 2025-01-23 PROCEDURE — 3074F SYST BP LT 130 MM HG: CPT | Mod: CPTII,S$GLB,, | Performed by: INTERNAL MEDICINE

## 2025-01-23 PROCEDURE — 99396 PREV VISIT EST AGE 40-64: CPT | Mod: S$GLB,,, | Performed by: INTERNAL MEDICINE

## 2025-01-23 PROCEDURE — 1159F MED LIST DOCD IN RCRD: CPT | Mod: CPTII,S$GLB,, | Performed by: INTERNAL MEDICINE

## 2025-01-23 PROCEDURE — 3044F HG A1C LEVEL LT 7.0%: CPT | Mod: CPTII,S$GLB,, | Performed by: INTERNAL MEDICINE

## 2025-01-23 RX ORDER — ATORVASTATIN CALCIUM 10 MG/1
10 TABLET, FILM COATED ORAL DAILY
Qty: 90 TABLET | Refills: 3 | Status: SHIPPED | OUTPATIENT
Start: 2025-01-23 | End: 2026-01-23

## 2025-01-23 NOTE — PROGRESS NOTES
Subjective:       Patient ID: Logan Vaz is a 49 y.o. male.    Medication List with Changes/Refills   New Medications    ATORVASTATIN (LIPITOR) 10 MG TABLET    Take 1 tablet (10 mg total) by mouth once daily.   Current Medications    FLUTICASONE PROPIONATE (FLONASE) 50 MCG/ACTUATION NASAL SPRAY    2 sprays (100 mcg total) by Each Nostril route once daily.    PANTOPRAZOLE (PROTONIX) 40 MG TABLET    Take 1 tablet (40 mg total) by mouth once daily.       Chief Complaint: Annual Exam  He is here today for an annual exam.     He has chronic allergies with eustachian tube dysfunction. He is using flonase with good success and denies any active symptoms.    He has hyperlipidemia with a family history of early heart disease. He is not on treatment. Lipids on 1/2025 were 244/184/36/151.  CT calcium score on 12/2021 was 0.     He has GERD and is doing well on pantoprazole 40 mg daily. He denies any active symptoms.     He does complain of chronic diarrhea for about a year. He will have intermittent symptoms but can not pinpoint a trigger.  No blood in stool. No nausea or vomiting. No abdominal pain.  It is mild and resolves with time. He has normal stools between events.     He has elevated LFTs on labs on 1/2025 show AST 25 and ALT 65. He denies drinking alcohol heavily.     He has a history of ADEM which was mistaken for MS.  He was followed by neurology and has had serial MRIs that are unchanged. He was given reassurance and denies any recurrent symptoms (facial numbness with dysgeusia).      He lives with his wife and children. 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  He does not exercise but stays active. He just started eating healthy.     Colonoscopy---none scheduled   Tdap---3/2016   Influenza vaccine---refused   Covid vaccine-----2 doses     Review of Systems   Constitutional:  Negative for appetite change, fatigue, fever and unexpected weight change.   HENT:  Negative for congestion, ear pain,  "hearing loss, sore throat and trouble swallowing.    Eyes:  Negative for pain and visual disturbance.   Respiratory:  Negative for cough, chest tightness, shortness of breath and wheezing.    Cardiovascular:  Negative for chest pain, palpitations and leg swelling.   Gastrointestinal:  Positive for diarrhea. Negative for abdominal pain, blood in stool, constipation, nausea and vomiting.   Endocrine: Negative for polyuria.   Genitourinary:  Negative for dysuria and hematuria.   Musculoskeletal:  Negative for arthralgias, back pain and myalgias.   Skin:  Negative for rash.   Neurological:  Negative for dizziness, weakness, numbness and headaches.   Hematological:  Does not bruise/bleed easily.   Psychiatric/Behavioral:  Negative for dysphoric mood, sleep disturbance and suicidal ideas. The patient is not nervous/anxious.        Objective:      Vitals:    01/23/25 1446   BP: 100/80   BP Location: Left arm   Patient Position: Sitting   Pulse: 84   Resp: 18   Temp: 97.7 °F (36.5 °C)   TempSrc: Temporal   SpO2: 99%   Weight: 97.9 kg (215 lb 13.3 oz)   Height: 5' 10" (1.778 m)     Body mass index is 30.97 kg/m².  Physical Exam    General appearance: No acute distress, cooperative  Eyes: PERRL, EOMI, conjunctiva clear  Ears: normal external ear and pinna, tm clear without drainage, canals clear  Nose: Normal mucosa without drainage  Throat: no exudates or erythema, tonsils not enlarged  Mouth: no sores or lesions, moist mucous membranes  Neck: FROM, soft, supple, no thyromegaly, no bruits  Lymph: no anterior or posterior cervical adenopathy  Heart::  Regular rate and rhythm, no murmur  Lung: Clear to ascultation bilaterally, no wheezing, no rales, no rhonchi, no distress  Abdomen: Soft, nontender, no distention, no hepatosplenomegaly, bowel sounds normal, no guarding, no rebound, no peritoneal signs  Skin: no rashes, no lesions  Extremities: no edema, no cyanosis  Neuro: CN 2-12 intact, 5/5 muscle strength upper and lower " extremity bilaterally, 2+ DTRs UE and LE bilaterally, normal gait  Peripheral pulses: 2+ pedal pulses bilaterally, good perfusion and color  Musculoskeletal: FROM, good strenth, no tenderness  Joint: normal appearance, no swelling, no warmth, no deformity in all joints    Assessment:       1. Well adult exam    2. Hyperlipidemia, unspecified hyperlipidemia type    3. Chronic allergic rhinitis    4. Gastroesophageal reflux disease without esophagitis    5. Chronic diarrhea    6. Elevated LFTs    7. Class 1 obesity due to excess calories with serious comorbidity and body mass index (BMI) of 30.0 to 30.9 in adult    8. Screen for colon cancer        Plan:       Well adult exam  He is UTD on his labs. Given fecal kit today    Hyperlipidemia, unspecified hyperlipidemia type  Uncontrolled and elevated risk score now for over 8 years with a history of CAD.  Start atorvastatin 10 mg daily. Recheck lipids in 6 months.   -     atorvastatin (LIPITOR) 10 MG tablet; Take 1 tablet (10 mg total) by mouth once daily.  Dispense: 90 tablet; Refill: 3  -     Lipid Panel; Future; Expected date: 01/23/2025    Chronic allergic rhinitis  Good control on flonase    Gastroesophageal reflux disease without esophagitis  Well controlled and continue current regimen.     Chronic diarrhea  Intermittent symptoms and discussed need for stools studies and colonoscopy. He feels since changing his diet and eating healthier this last week the diarrhea is a little better. Continue to monitor and consider referral to GI for possible colonoscopy.     Elevated LFTs  Suspect fatty liver. Will get liver u/s.  Will recheck LFTs in 6 months.   -     Comprehensive Metabolic Panel; Future; Expected date: 01/23/2025  -     US Abdomen Limited; Future; Expected date: 01/23/2025    Class 1 obesity due to excess calories with serious comorbidity and body mass index (BMI) of 30.0 to 30.9 in adult  Long discussion on the benefits of healthy eating and regular exercise  to help lose weight and help control hyperlipidemia.      Screen for colon cancer  -     Fecal Immunochemical Test (iFOBT); Future; Expected date: 01/23/2025    Follow up in about 1 year (around 1/23/2026) for annual exam.

## 2025-01-31 ENCOUNTER — HOSPITAL ENCOUNTER (OUTPATIENT)
Dept: RADIOLOGY | Facility: HOSPITAL | Age: 50
Discharge: HOME | End: 2025-01-31
Attending: INTERNAL MEDICINE
Payer: COMMERCIAL

## 2025-01-31 DIAGNOSIS — R79.89 ELEVATED LFTS: ICD-10-CM

## 2025-01-31 PROCEDURE — 76705 ECHO EXAM OF ABDOMEN: CPT | Mod: TC,PO

## 2025-01-31 PROCEDURE — 76705 ECHO EXAM OF ABDOMEN: CPT | Mod: 26,,, | Performed by: STUDENT IN AN ORGANIZED HEALTH CARE EDUCATION/TRAINING PROGRAM

## 2025-02-03 ENCOUNTER — PATIENT MESSAGE (OUTPATIENT)
Dept: FAMILY MEDICINE | Facility: CLINIC | Age: 50
End: 2025-02-03
Payer: COMMERCIAL

## 2025-02-04 ENCOUNTER — LAB VISIT (OUTPATIENT)
Dept: LAB | Facility: HOSPITAL | Age: 50
End: 2025-02-04
Attending: INTERNAL MEDICINE
Payer: COMMERCIAL

## 2025-02-04 DIAGNOSIS — Z12.11 SCREEN FOR COLON CANCER: ICD-10-CM

## 2025-02-04 LAB — HEMOCCULT STL QL IA: NEGATIVE

## 2025-02-04 PROCEDURE — 82274 ASSAY TEST FOR BLOOD FECAL: CPT | Performed by: INTERNAL MEDICINE

## 2025-04-22 DIAGNOSIS — J31.0 CHRONIC RHINITIS: ICD-10-CM

## 2025-04-22 RX ORDER — FLUTICASONE PROPIONATE 50 MCG
SPRAY, SUSPENSION (ML) NASAL
Qty: 48 G | Refills: 3 | Status: SHIPPED | OUTPATIENT
Start: 2025-04-22

## 2025-04-22 NOTE — TELEPHONE ENCOUNTER
Refill Routing Note   Medication(s) are not appropriate for processing by [facility] for the following reason(s):        Due for refill >6 months ago    [facility] action(s):  Defer             Appointments  past 12m or future 3m with PCP    Date Provider   Last Visit   1/23/2025 Juliann Srinivasan,    Next Visit   Visit date not found Juliann Srinivasan, DO   ED visits in past 90 days: 0        Note composed:10:10 AM 04/22/2025

## 2025-04-22 NOTE — TELEPHONE ENCOUNTER
No care due was identified.  [facility] Embedded Care Due Messages. Reference number: [id number].   4/22/2025 5:09:07 AM CDT

## 2025-04-23 DIAGNOSIS — K21.9 GASTROESOPHAGEAL REFLUX DISEASE WITHOUT ESOPHAGITIS: ICD-10-CM

## 2025-04-23 NOTE — TELEPHONE ENCOUNTER
No care due was identified.  [facility] Embedded Care Due Messages. Reference number: [id number].   4/23/2025 4:03:21 PM CDT

## 2025-04-24 RX ORDER — PANTOPRAZOLE SODIUM 40 MG/1
40 TABLET, DELAYED RELEASE ORAL DAILY
Qty: 90 TABLET | Refills: 3 | Status: SHIPPED | OUTPATIENT
Start: 2025-04-24

## 2025-04-29 ENCOUNTER — E-VISIT (OUTPATIENT)
Dept: FAMILY MEDICINE | Facility: CLINIC | Age: 50
End: 2025-04-29
Payer: COMMERCIAL

## 2025-04-29 DIAGNOSIS — K21.9 GASTROESOPHAGEAL REFLUX DISEASE WITHOUT ESOPHAGITIS: Primary | ICD-10-CM

## 2025-04-30 RX ORDER — ESOMEPRAZOLE MAGNESIUM 40 MG/1
40 CAPSULE, DELAYED RELEASE ORAL
Qty: 90 CAPSULE | Refills: 2 | Status: SHIPPED | OUTPATIENT
Start: 2025-04-30 | End: 2026-04-30

## 2025-04-30 NOTE — PROGRESS NOTES
Patient ID: Logan Vaz is a 50 y.o. male.    Chief Complaint: General Illness (Entered automatically based on patient selection in [employer].)    The patient initiated a request through [employer] on 4/29/2025 for evaluation and management with a chief complaint of General Illness (Entered automatically based on patient selection in [employer].)     I evaluated the questionnaire submission on 4/30/2025.    [facility]-Medication    4/29/2025  9:00 PM CDT - Filed by Patient   What do you need help with? Medication Request   Do you agree to participate in an E-Visit? Yes   If you have any of the following symptoms, please present to your local emergency room or call 911:  I acknowledge   Medication requests for narcotics will not be addressed via an E-Visit.  Please schedule an appointment. I acknowledge   Do you want to address a new or existing medication? I would like to address a medication I currently take   What is the main issue you would like addressed today? Medication is not fully covered to take once daily   Would you like to change or continue your medication? Change medication   What medication would you like changed?  pantoprazole 40 MG tablet   What is your current dose? 40 mg   How often do you take your medication? 1 time daily   Why do you need the medication changed? Cost/insurance issues   Which best describes your cost or insurance problem? Insurance won't pay    What medical condition is the  medication intended to treat? Heartburn   Provide any additional information you feel is important.    Please attach any relevant images or files    Are you able to take your vital signs? No         Encounter Diagnosis   Name Primary?    Gastroesophageal reflux disease without esophagitis Yes        No orders of the defined types were placed in this encounter.     Medications Ordered This Encounter   Medications    esomeprazole (NEXIUM) 40 MG capsule     Sig: Take 1 capsule (40 mg total) by  mouth before breakfast.     Dispense:  90 capsule     Refill:  2    GERD that is controlled on pantoprazole 40 mg daily but insurance will not cover.  Will switch to nexium 40 mg daily. If this is not covered by insurance then he will have to pay out of pocket.     Follow up if symptoms worsen or fail to improve.      E-Visit Time Trackin minutes

## 2025-07-25 ENCOUNTER — OFFICE VISIT (OUTPATIENT)
Dept: FAMILY MEDICINE | Facility: CLINIC | Age: 50
End: 2025-07-25
Payer: COMMERCIAL

## 2025-07-25 ENCOUNTER — TELEPHONE (OUTPATIENT)
Dept: FAMILY MEDICINE | Facility: CLINIC | Age: 50
End: 2025-07-25

## 2025-07-25 VITALS
WEIGHT: 209.56 LBS | HEIGHT: 70 IN | SYSTOLIC BLOOD PRESSURE: 120 MMHG | OXYGEN SATURATION: 98 % | HEART RATE: 70 BPM | BODY MASS INDEX: 30 KG/M2 | TEMPERATURE: 98 F | DIASTOLIC BLOOD PRESSURE: 88 MMHG | RESPIRATION RATE: 17 BRPM

## 2025-07-25 DIAGNOSIS — K52.9 CHRONIC DIARRHEA: ICD-10-CM

## 2025-07-25 DIAGNOSIS — J30.9 CHRONIC ALLERGIC RHINITIS: ICD-10-CM

## 2025-07-25 DIAGNOSIS — E66.09 CLASS 1 OBESITY DUE TO EXCESS CALORIES WITH SERIOUS COMORBIDITY AND BODY MASS INDEX (BMI) OF 30.0 TO 30.9 IN ADULT: ICD-10-CM

## 2025-07-25 DIAGNOSIS — E66.811 CLASS 1 OBESITY DUE TO EXCESS CALORIES WITH SERIOUS COMORBIDITY AND BODY MASS INDEX (BMI) OF 30.0 TO 30.9 IN ADULT: ICD-10-CM

## 2025-07-25 DIAGNOSIS — K21.9 GASTROESOPHAGEAL REFLUX DISEASE WITHOUT ESOPHAGITIS: ICD-10-CM

## 2025-07-25 DIAGNOSIS — K76.0 FATTY LIVER: ICD-10-CM

## 2025-07-25 DIAGNOSIS — E78.5 HYPERLIPIDEMIA, UNSPECIFIED HYPERLIPIDEMIA TYPE: Primary | ICD-10-CM

## 2025-07-25 LAB
ALBUMIN SERPL BCP-MCNC: 4.1 G/DL (ref 3.5–5.2)
ALP SERPL-CCNC: 152 UNIT/L (ref 40–150)
ALT SERPL W/O P-5'-P-CCNC: 37 UNIT/L (ref 0–55)
ANION GAP (OHS): 4 MMOL/L (ref 8–16)
AST SERPL-CCNC: 32 UNIT/L (ref 0–50)
BILIRUB SERPL-MCNC: 0.5 MG/DL (ref 0.1–1)
BUN SERPL-MCNC: 15 MG/DL (ref 6–20)
CALCIUM SERPL-MCNC: 8.8 MG/DL (ref 8.7–10.5)
CHLORIDE SERPL-SCNC: 107 MMOL/L (ref 95–110)
CHOLEST SERPL-MCNC: 147 MG/DL (ref 120–199)
CHOLEST/HDLC SERPL: 4.5 {RATIO} (ref 2–5)
CO2 SERPL-SCNC: 28 MMOL/L (ref 23–29)
CREAT SERPL-MCNC: 1 MG/DL (ref 0.5–1.4)
GFR SERPLBLD CREATININE-BSD FMLA CKD-EPI: >60 ML/MIN/1.73/M2
GLUCOSE SERPL-MCNC: 100 MG/DL (ref 70–110)
HDLC SERPL-MCNC: 33 MG/DL (ref 40–75)
HDLC SERPL: 22.4 % (ref 20–50)
LDLC SERPL CALC-MCNC: 93 MG/DL (ref 63–159)
NONHDLC SERPL-MCNC: 114 MG/DL
POTASSIUM SERPL-SCNC: 4 MMOL/L (ref 3.5–5.1)
PROT SERPL-MCNC: 6.9 GM/DL (ref 6–8.4)
SODIUM SERPL-SCNC: 139 MMOL/L (ref 136–145)
TRIGL SERPL-MCNC: 105 MG/DL (ref 30–150)

## 2025-07-25 PROCEDURE — 80061 LIPID PANEL: CPT | Performed by: INTERNAL MEDICINE

## 2025-07-25 PROCEDURE — 80053 COMPREHEN METABOLIC PANEL: CPT | Performed by: INTERNAL MEDICINE

## 2025-07-25 RX ORDER — PANTOPRAZOLE SODIUM 40 MG/1
40 TABLET, DELAYED RELEASE ORAL DAILY
Qty: 90 TABLET | Refills: 3 | Status: SHIPPED | OUTPATIENT
Start: 2025-07-25

## 2025-07-25 NOTE — PROGRESS NOTES
Subjective:       Patient ID: Logan Vaz is a 50 y.o. male.    Medication List with Changes/Refills   Current Medications    ATORVASTATIN (LIPITOR) 10 MG TABLET    Take 1 tablet (10 mg total) by mouth once daily.    FLUTICASONE PROPIONATE (FLONASE) 50 MCG/ACTUATION NASAL SPRAY    SHAKE LIQUID AND USE 2 SPRAYS(100 MCG) IN EACH NOSTRIL EVERY DAY   Changed and/or Refilled Medications    Modified Medication Previous Medication    PANTOPRAZOLE (PROTONIX) 40 MG TABLET pantoprazole (PROTONIX) 40 MG tablet       Take 1 tablet (40 mg total) by mouth once daily.    Take 1 tablet (40 mg total) by mouth once daily.   Discontinued Medications    ESOMEPRAZOLE (NEXIUM) 40 MG CAPSULE    Take 1 capsule (40 mg total) by mouth before breakfast.       Chief Complaint: Annual Exam  He is here today for an annual exam.     He has chronic allergies with eustachian tube dysfunction. He is using flonase with good success and denies any active symptoms.    He has hyperlipidemia with a family history of early heart disease. Lipids on 1/2025 were 244/184/36/151.  CT calcium score on 12/2021 was 0. He is now taking atorvastatin 10 mg daily and is due to recheck lipids. He is tolerating well.     He has GERD and is doing well on pantoprazole 40 mg daily. He denies any active symptoms.     He continues with chronic diarrhea for about a year but has improved. He will now only have occasional symptoms but can not pinpoint a trigger.  No blood in stool. No nausea or vomiting. No abdominal pain.  It is mild and resolves with time. He has normal stools between events.     He has elevated LFTs on labs on 1/2025 show AST 25 and ALT 65. He denies drinking alcohol heavily. Liver u/s on 1/2025 showed fatty liver and mild splenomegaly. He has lost 10 lbs with dietary changes.     He has a history of ADEM which was mistaken for MS.  He was followed by neurology and has had serial MRIs that are unchanged. He was given reassurance and denies any  "recurrent symptoms (facial numbness with dysgeusia).      He lives with his wife and children. 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  He does not exercise but stays active. He is eating healthy.     Colonoscopy---fecal kit 2/2025 neg   Tdap---3/2016   Influenza vaccine---refused   Covid vaccine-----2 doses     Review of Systems   Constitutional:  Negative for appetite change, fatigue, fever and unexpected weight change.   HENT:  Negative for congestion, ear pain, hearing loss, sore throat and trouble swallowing.    Eyes:  Negative for pain and visual disturbance.   Respiratory:  Negative for cough, chest tightness, shortness of breath and wheezing.    Cardiovascular:  Negative for chest pain, palpitations and leg swelling.   Gastrointestinal:  Negative for abdominal pain, blood in stool, constipation, diarrhea, nausea and vomiting.   Endocrine: Negative for polyuria.   Genitourinary:  Negative for difficulty urinating, dysuria, frequency and hematuria.   Musculoskeletal:  Negative for arthralgias, back pain and myalgias.   Skin:  Negative for rash.   Neurological:  Negative for dizziness, weakness, numbness and headaches.   Hematological:  Does not bruise/bleed easily.   Psychiatric/Behavioral:  Negative for dysphoric mood, sleep disturbance and suicidal ideas. The patient is not nervous/anxious.        Objective:      Vitals:    07/25/25 0744   BP: 120/88   BP Location: Right arm   Patient Position: Sitting   Pulse: 70   Resp: 17   Temp: 98.2 °F (36.8 °C)   TempSrc: Temporal   SpO2: 98%   Weight: 95.1 kg (209 lb 8.8 oz)   Height: 5' 10" (1.778 m)     Body mass index is 30.07 kg/m².  Physical Exam    General appearance: No acute distress, cooperative  Eyes: PERRL, EOMI, conjunctiva clear  Ears: normal external ear and pinna, tm clear without drainage, canals clear  Nose: Normal mucosa without drainage  Throat: no exudates or erythema, tonsils not enlarged  Mouth: no sores or lesions, moist mucous " membranes  Neck: FROM, soft, supple, no thyromegaly, no bruits  Lymph: no anterior or posterior cervical adenopathy  Heart::  Regular rate and rhythm, no murmur  Lung: Clear to ascultation bilaterally, no wheezing, no rales, no rhonchi, no distress  Abdomen: Soft, nontender, no distention, no hepatosplenomegaly, bowel sounds normal, no guarding, no rebound, no peritoneal signs  Skin: no rashes, no lesions  Extremities: no edema, no cyanosis  Neuro: CN 2-12 intact, 5/5 muscle strength upper and lower extremity bilaterally, 2+ DTRs UE and LE bilaterally, normal gait  Peripheral pulses: 2+ pedal pulses bilaterally, good perfusion and color  Musculoskeletal: FROM, good strenth, no tenderness  Joint: normal appearance, no swelling, no warmth, no deformity in all joints    Assessment:       1. Hyperlipidemia, unspecified hyperlipidemia type    2. Chronic allergic rhinitis    3. Chronic diarrhea    4. Gastroesophageal reflux disease without esophagitis    5. Fatty liver    6. Class 1 obesity due to excess calories with serious comorbidity and body mass index (BMI) of 30.0 to 30.9 in adult        Plan:       Hyperlipidemia, unspecified hyperlipidemia type  Due to recheck lipids on atorvastatin.   -     Comprehensive Metabolic Panel  -     Lipid Panel    Chronic allergic rhinitis  Good control on flonase    Chronic diarrhea  Mild and slowly improving    Gastroesophageal reflux disease without esophagitis  Good control on pantoprazole and refill given today.   -     pantoprazole (PROTONIX) 40 MG tablet; Take 1 tablet (40 mg total) by mouth once daily.  Dispense: 90 tablet; Refill: 3    Fatty liver  Fatty liver with elevated LFTs. Will repeat today with his weight loss. If still elevated then will refer to hepatology for a fibroscan.    Class 1 obesity due to excess calories with serious comorbidity and body mass index (BMI) of 30.0 to 30.9 in adult  Long discussion on the benefits of healthy eating and regular exercise to  help lose weight and help control hyperlipidemia.     Follow up in about 1 year (around 7/25/2026) for annual exam.

## 2025-07-29 ENCOUNTER — PATIENT MESSAGE (OUTPATIENT)
Dept: FAMILY MEDICINE | Facility: CLINIC | Age: 50
End: 2025-07-29
Payer: COMMERCIAL